# Patient Record
Sex: FEMALE
[De-identification: names, ages, dates, MRNs, and addresses within clinical notes are randomized per-mention and may not be internally consistent; named-entity substitution may affect disease eponyms.]

---

## 2017-06-07 ENCOUNTER — HOSPITAL ENCOUNTER (OUTPATIENT)
Dept: HOSPITAL 62 - RAD | Age: 82
End: 2017-06-07
Attending: PHYSICIAN ASSISTANT
Payer: MEDICARE

## 2017-06-07 DIAGNOSIS — M51.16: Primary | ICD-10-CM

## 2017-06-07 PROCEDURE — 72148 MRI LUMBAR SPINE W/O DYE: CPT

## 2017-06-08 NOTE — RADIOLOGY REPORT (SQ)
EXAM DESCRIPTION:  MRI LUMBAR SPINE WITHOUT



COMPLETED DATE/TIME:  6/7/2017 6:30 pm



REASON FOR STUDY:  OTHER INTERVERTEBRAL DISC DEGENERATION,LUMBAR REGION;RADICULOPATHY,LUMBAR R M51.36
  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



COMPARISON:  3/28/2008



TECHNIQUE:  Sagittal and Axial imaging includes T1, T2, STIR and gradient echo sequences. Coronal T2/
HASTE imaging.



LIMITATIONS:  Patient motion.



FINDINGS:  VISUALIZED UPPER ABDOMEN:  Limited evaluation. No acute or suspicious findings suggested.

SEGMENTATION: No transitional anatomy. The lowest well-developed disc space is labeled L5-S1.

ALIGNMENT: Convex left scoliosis.  Grade 1 anterolisthesis L4 relative to L3 and L5.

VERTEBRAE: 20% height loss L3 with preserved marrow signal.

BONE MARROW: Normal. No marrow replacement or reactive changes.

DISC SIGNAL: Desiccation multiple levels.

POSTERIOR ELEMENTS:  See individual levels below.

HARDWARE: None in the spine.

CORD AND CONUS: Normal in size and signal intensity. Conus at the appropriate level.

SOFT TISSUES: No aortic aneurysm seen. No bulky retroperitoneal adenopathy or mass. No paraspinal mas
s or fluid.

L1-L2: Ventral impression on the thecal sac due to disc bulge.  Facet arthropathy.  Mild neural michelle
inal narrowing bilaterally.

L2-L3: Ventral impression on the thecal sac due disc bulge.  Facet arthropathy.  Mild neural foramina
l narrowing, left greater than right.

L3-L4: Prior posterior decompression.  Canal is widely patent.  Bilateral exiting nerve root contact 
due to disc osteophyte complex, right greater than left.

L4-L5: Prior posterior decompression.  Bilateral exiting nerve root contact due to disc osteophyte co
mplex.

L5-S1: Disc bulge and facet arthropathy.  No significant spinal stenosis.  Mild neural foraminal narr
owing, left greater than right.

LOWER THORACIC: Incompletely imaged.  No stenosis seen.

SACRUM: Visualized upper sacrum intact.

OTHER: No other significant findings.



IMPRESSION:  Spondylosis, facet arthropathy and malalignment status post dorsal decompression L3- 4 a
nd L4- 5.  No significant change.



TECHNICAL DOCUMENTATION:  JOB ID:  [id number]

 [employer]- All Rights Reserved

## 2017-06-09 ENCOUNTER — HOSPITAL ENCOUNTER (OUTPATIENT)
Dept: HOSPITAL 62 - LAB | Age: 82
End: 2017-06-09
Attending: NURSE PRACTITIONER
Payer: MEDICARE

## 2017-06-09 DIAGNOSIS — R30.0: Primary | ICD-10-CM

## 2017-06-09 PROCEDURE — 87186 SC STD MICRODIL/AGAR DIL: CPT

## 2017-06-09 PROCEDURE — 87086 URINE CULTURE/COLONY COUNT: CPT

## 2017-06-09 PROCEDURE — 87088 URINE BACTERIA CULTURE: CPT

## 2017-11-27 ENCOUNTER — HOSPITAL ENCOUNTER (OUTPATIENT)
Dept: HOSPITAL 62 - WI | Age: 82
End: 2017-11-27
Attending: FAMILY MEDICINE
Payer: MEDICARE

## 2017-11-27 DIAGNOSIS — Z12.31: Primary | ICD-10-CM

## 2017-11-27 PROCEDURE — 77067 SCR MAMMO BI INCL CAD: CPT

## 2017-11-27 PROCEDURE — G0202 SCR MAMMO BI INCL CAD: HCPCS

## 2017-11-28 NOTE — WOMENS IMAGING REPORT
EXAM DESCRIPTION:  BILAT SCREENING MAMMO W/CAD



COMPLETED DATE/TIME:  11/27/2017 8:07 am



REASON FOR STUDY:  SCREENING MAMMO Z12.31  ENCNTR SCREEN MAMMOGRAM FOR MALIGNANT NEOPLASM OF PRASAD



COMPARISON:  Multiple since 2009



TECHNIQUE:  Standard craniocaudal and mediolateral oblique views of each breast recorded using digita
l acquisition.



LIMITATIONS:  None.



FINDINGS:  Findings present which are benign by mammographic criteria.  No suspicious masses, calcifi
cations or architectural distortion.

Pertinent benign findings: Scattered stable benign bilateral breast parenchymal and skin calcificatio
ns

Read with the assistance of CAD.

.[facility] - R2 Cenova Version 1.3

.[facility] - R2 Cenova Version 1.3

.[facility] - R2 Cenova Version 2.4

.[facility] - R2 Cenova Version 2.4

.[facility] - R2  Version 9.2

Benign mammographic findings may include one or more of the following:  Smooth masses, popcorn/rim/co
arse calcifications, asymmetries, post-procedure changes, and lesions with long-standing stability.



IMPRESSION:  BENIGN MAMMOGRAPHIC FINDINGS.  BIRADS 2



BREAST DENSITY:  b. There are scattered areas of fibroglandular density.



BIRAD:  2 BENIGN FINDING(S)



RECOMMENDATION:  ROUTINE SCREENING

Please consider bilateral screening tomosynthesis in November 2018



COMMENT:  The patient has been notified of the results by letter per SA requirements. Additional no
tification policies are in place for contacting patient with suspicious or incomplete findings.

Quality ID #[id number]: The American College of Radiology recommends an annual screening mammogram for women
 aged 40 years or over. This facility utilizes a reminder system to ensure that all patients receive 
reminder letters, and/or direct phone calls for appointments. This includes reminders for routine scr
eening mammograms, diagnostic mammograms, or other Breast Imaging Interventions when appropriate.  Th
is patient will be placed in the appropriate reminder system.

The American College of Radiology (ACR) has developed recommendations for screening MRI of the breast
s in certain patient populations, to be used in conjunction with mammography.  Breast MRI surveillanc
e may be appropriate for women with more than 20% lifetime risk of developing breast cancer  as deter
mined by genetic testing, significant family history of the disease, or history of mantle radiation f
or Hodgkins Disease.  ACR Practice Guidelines 2008.



TECHNICAL DOCUMENTATION:  FINDING NUMBER: (1)

ASSESSMENT: (1)

JOB ID:  [id number]

 2011 [employer]- All Rights Reserved

## 2018-01-03 ENCOUNTER — HOSPITAL ENCOUNTER (INPATIENT)
Dept: HOSPITAL 62 - ER | Age: 83
LOS: 3 days | Discharge: ANOTHER HOSPITAL | DRG: 208 | End: 2018-01-06
Attending: INTERNAL MEDICINE | Admitting: INTERNAL MEDICINE
Payer: MEDICARE

## 2018-01-03 DIAGNOSIS — J44.1: ICD-10-CM

## 2018-01-03 DIAGNOSIS — Z79.4: ICD-10-CM

## 2018-01-03 DIAGNOSIS — I25.10: ICD-10-CM

## 2018-01-03 DIAGNOSIS — J96.01: ICD-10-CM

## 2018-01-03 DIAGNOSIS — I11.0: ICD-10-CM

## 2018-01-03 DIAGNOSIS — E87.0: ICD-10-CM

## 2018-01-03 DIAGNOSIS — I50.9: ICD-10-CM

## 2018-01-03 DIAGNOSIS — D64.9: ICD-10-CM

## 2018-01-03 DIAGNOSIS — G89.29: ICD-10-CM

## 2018-01-03 DIAGNOSIS — E66.01: ICD-10-CM

## 2018-01-03 DIAGNOSIS — Z78.1: ICD-10-CM

## 2018-01-03 DIAGNOSIS — Z79.899: ICD-10-CM

## 2018-01-03 DIAGNOSIS — Z79.891: ICD-10-CM

## 2018-01-03 DIAGNOSIS — J96.02: ICD-10-CM

## 2018-01-03 DIAGNOSIS — J69.0: Primary | ICD-10-CM

## 2018-01-03 DIAGNOSIS — M19.90: ICD-10-CM

## 2018-01-03 DIAGNOSIS — F32.9: ICD-10-CM

## 2018-01-03 DIAGNOSIS — Z88.0: ICD-10-CM

## 2018-01-03 DIAGNOSIS — Z88.6: ICD-10-CM

## 2018-01-03 DIAGNOSIS — Z95.1: ICD-10-CM

## 2018-01-03 DIAGNOSIS — L40.9: ICD-10-CM

## 2018-01-03 DIAGNOSIS — E87.6: ICD-10-CM

## 2018-01-03 DIAGNOSIS — Z87.891: ICD-10-CM

## 2018-01-03 DIAGNOSIS — Z86.73: ICD-10-CM

## 2018-01-03 DIAGNOSIS — E11.9: ICD-10-CM

## 2018-01-03 DIAGNOSIS — F41.1: ICD-10-CM

## 2018-01-03 LAB
ADD MANUAL DIFF: NO
ALBUMIN SERPL-MCNC: 4.3 G/DL (ref 3.5–5)
ALP SERPL-CCNC: 43 U/L (ref 38–126)
ALT SERPL-CCNC: 28 U/L (ref 9–52)
ANION GAP SERPL CALC-SCNC: 15 MMOL/L (ref 5–19)
AST SERPL-CCNC: 32 U/L (ref 14–36)
BASE EXCESS BLDV CALC-SCNC: 5.2 MMOL/L
BASOPHILS # BLD AUTO: 0 10^3/UL (ref 0–0.2)
BASOPHILS NFR BLD AUTO: 0.6 % (ref 0–2)
BILIRUB DIRECT SERPL-MCNC: 0.4 MG/DL (ref 0–0.4)
BILIRUB SERPL-MCNC: 0.5 MG/DL (ref 0.2–1.3)
BUN SERPL-MCNC: 22 MG/DL (ref 7–20)
CALCIUM: 9.6 MG/DL (ref 8.4–10.2)
CHLORIDE SERPL-SCNC: 99 MMOL/L (ref 98–107)
CK SERPL-CCNC: 56 U/L (ref 30–135)
CO2 SERPL-SCNC: 29 MMOL/L (ref 22–30)
EOSINOPHIL # BLD AUTO: 0 10^3/UL (ref 0–0.6)
EOSINOPHIL NFR BLD AUTO: 0.4 % (ref 0–6)
ERYTHROCYTE [DISTWIDTH] IN BLOOD BY AUTOMATED COUNT: 15.2 % (ref 11.5–14)
GLUCOSE SERPL-MCNC: 143 MG/DL (ref 75–110)
HCO3 BLDV-SCNC: 31.5 MMOL/L (ref 20–32)
HCT VFR BLD CALC: 35.6 % (ref 36–47)
HGB BLD-MCNC: 11.8 G/DL (ref 12–15.5)
LYMPHOCYTES # BLD AUTO: 0.8 10^3/UL (ref 0.5–4.7)
LYMPHOCYTES NFR BLD AUTO: 9.8 % (ref 13–45)
MCH RBC QN AUTO: 29.1 PG (ref 27–33.4)
MCHC RBC AUTO-ENTMCNC: 33.1 G/DL (ref 32–36)
MCV RBC AUTO: 88 FL (ref 80–97)
MONOCYTES # BLD AUTO: 0.5 10^3/UL (ref 0.1–1.4)
MONOCYTES NFR BLD AUTO: 5.9 % (ref 3–13)
NEUTROPHILS # BLD AUTO: 7 10^3/UL (ref 1.7–8.2)
NEUTS SEG NFR BLD AUTO: 83.3 % (ref 42–78)
PCO2 BLDV: 53.4 MMHG (ref 35–63)
PH BLDV: 7.39 [PH] (ref 7.3–7.42)
PLATELET # BLD: 219 10^3/UL (ref 150–450)
POTASSIUM SERPL-SCNC: 3.4 MMOL/L (ref 3.6–5)
PROT SERPL-MCNC: 6.8 G/DL (ref 6.3–8.2)
RBC # BLD AUTO: 4.05 10^6/UL (ref 3.72–5.28)
SODIUM SERPL-SCNC: 143.3 MMOL/L (ref 137–145)
TOTAL CELLS COUNTED % (AUTO): 100 %
WBC # BLD AUTO: 8.4 10^3/UL (ref 4–10.5)

## 2018-01-03 PROCEDURE — 94640 AIRWAY INHALATION TREATMENT: CPT

## 2018-01-03 PROCEDURE — 81001 URINALYSIS AUTO W/SCOPE: CPT

## 2018-01-03 PROCEDURE — 36415 COLL VENOUS BLD VENIPUNCTURE: CPT

## 2018-01-03 PROCEDURE — 80053 COMPREHEN METABOLIC PANEL: CPT

## 2018-01-03 PROCEDURE — 99285 EMERGENCY DEPT VISIT HI MDM: CPT

## 2018-01-03 PROCEDURE — 87804 INFLUENZA ASSAY W/OPTIC: CPT

## 2018-01-03 PROCEDURE — 87088 URINE BACTERIA CULTURE: CPT

## 2018-01-03 PROCEDURE — 74018 RADEX ABDOMEN 1 VIEW: CPT

## 2018-01-03 PROCEDURE — 94660 CPAP INITIATION&MGMT: CPT

## 2018-01-03 PROCEDURE — 87040 BLOOD CULTURE FOR BACTERIA: CPT

## 2018-01-03 PROCEDURE — 87186 SC STD MICRODIL/AGAR DIL: CPT

## 2018-01-03 PROCEDURE — 51701 INSERT BLADDER CATHETER: CPT

## 2018-01-03 PROCEDURE — 96375 TX/PRO/DX INJ NEW DRUG ADDON: CPT

## 2018-01-03 PROCEDURE — 83880 ASSAY OF NATRIURETIC PEPTIDE: CPT

## 2018-01-03 PROCEDURE — 96365 THER/PROPH/DIAG IV INF INIT: CPT

## 2018-01-03 PROCEDURE — 85025 COMPLETE CBC W/AUTO DIFF WBC: CPT

## 2018-01-03 PROCEDURE — 31500 INSERT EMERGENCY AIRWAY: CPT

## 2018-01-03 PROCEDURE — 87070 CULTURE OTHR SPECIMN AEROBIC: CPT

## 2018-01-03 PROCEDURE — 94002 VENT MGMT INPAT INIT DAY: CPT

## 2018-01-03 PROCEDURE — 82550 ASSAY OF CK (CPK): CPT

## 2018-01-03 PROCEDURE — 87205 SMEAR GRAM STAIN: CPT

## 2018-01-03 PROCEDURE — 82803 BLOOD GASES ANY COMBINATION: CPT

## 2018-01-03 PROCEDURE — 83735 ASSAY OF MAGNESIUM: CPT

## 2018-01-03 PROCEDURE — 93010 ELECTROCARDIOGRAM REPORT: CPT

## 2018-01-03 PROCEDURE — 71275 CT ANGIOGRAPHY CHEST: CPT

## 2018-01-03 PROCEDURE — 83605 ASSAY OF LACTIC ACID: CPT

## 2018-01-03 PROCEDURE — 71045 X-RAY EXAM CHEST 1 VIEW: CPT

## 2018-01-03 PROCEDURE — 82962 GLUCOSE BLOOD TEST: CPT

## 2018-01-03 PROCEDURE — 80048 BASIC METABOLIC PNL TOTAL CA: CPT

## 2018-01-03 PROCEDURE — 87077 CULTURE AEROBIC IDENTIFY: CPT

## 2018-01-03 PROCEDURE — 93005 ELECTROCARDIOGRAM TRACING: CPT

## 2018-01-03 PROCEDURE — 87086 URINE CULTURE/COLONY COUNT: CPT

## 2018-01-03 PROCEDURE — 84484 ASSAY OF TROPONIN QUANT: CPT

## 2018-01-04 LAB
A TYPE INFLUENZA AG: NEGATIVE
APPEARANCE UR: CLEAR
APTT PPP: YELLOW S
ARTERIAL BLOOD FIO2: (no result)
ARTERIAL BLOOD H2CO3: 1.82 MMOL/L (ref 1.05–1.35)
ARTERIAL BLOOD HCO3: 28.4 MMOL/L (ref 20–26)
ARTERIAL BLOOD PCO2: 60.4 MMHG (ref 35–45)
ARTERIAL BLOOD PH: 7.29 (ref 7.35–7.45)
ARTERIAL BLOOD PO2: 75.6 MMHG (ref 80–100)
ARTERIAL BLOOD TOTAL CO2: 30.2 MMOL/L (ref 21–25)
B INFLUENZA AG: NEGATIVE
BASE EXCESS BLDA CALC-SCNC: 0.9 MMOL/L
BASE EXCESS BLDV CALC-SCNC: 1.3 MMOL/L
BILIRUB UR QL STRIP: NEGATIVE
GLUCOSE UR STRIP-MCNC: NEGATIVE MG/DL
HCO3 BLDV-SCNC: 30.5 MMOL/L (ref 20–32)
KETONES UR STRIP-MCNC: NEGATIVE MG/DL
NITRITE UR QL STRIP: POSITIVE
PCO2 BLDV: 72.2 MMHG (ref 35–63)
PH BLDV: 7.24 [PH] (ref 7.3–7.42)
PH UR STRIP: 5 [PH] (ref 5–9)
PROT UR STRIP-MCNC: NEGATIVE MG/DL
SAO2 % BLDA: 93.3 % (ref 94–98)
SP GR UR STRIP: 1.01
TROPONIN I SERPL-MCNC: 0.07 NG/ML
UROBILINOGEN UR-MCNC: NEGATIVE MG/DL (ref ?–2)

## 2018-01-04 PROCEDURE — 5A09457 ASSISTANCE WITH RESPIRATORY VENTILATION, 24-96 CONSECUTIVE HOURS, CONTINUOUS POSITIVE AIRWAY PRESSURE: ICD-10-PCS | Performed by: INTERNAL MEDICINE

## 2018-01-04 RX ADMIN — HEPARIN SODIUM SCH UNIT: 5000 INJECTION, SOLUTION INTRAVENOUS; SUBCUTANEOUS at 09:27

## 2018-01-04 RX ADMIN — IPRATROPIUM BROMIDE AND ALBUTEROL SULFATE SCH ML: 2.5; .5 SOLUTION RESPIRATORY (INHALATION) at 20:44

## 2018-01-04 RX ADMIN — FUROSEMIDE SCH MG: 40 TABLET ORAL at 09:14

## 2018-01-04 RX ADMIN — GUAIFENESIN SCH MG: 600 TABLET, EXTENDED RELEASE ORAL at 21:25

## 2018-01-04 RX ADMIN — METOPROLOL TARTRATE SCH MG: 50 TABLET, FILM COATED ORAL at 17:31

## 2018-01-04 RX ADMIN — INSULIN LISPRO PRN UNIT: 100 INJECTION, SOLUTION INTRAVENOUS; SUBCUTANEOUS at 08:57

## 2018-01-04 RX ADMIN — GUAIFENESIN SCH MG: 600 TABLET, EXTENDED RELEASE ORAL at 09:15

## 2018-01-04 RX ADMIN — HEPARIN SODIUM SCH UNIT: 5000 INJECTION, SOLUTION INTRAVENOUS; SUBCUTANEOUS at 14:08

## 2018-01-04 RX ADMIN — MAGNESIUM OXIDE TAB 400 MG (241.3 MG ELEMENTAL MG) SCH MG: 400 (241.3 MG) TAB at 09:11

## 2018-01-04 RX ADMIN — FUROSEMIDE SCH MG: 40 TABLET ORAL at 17:30

## 2018-01-04 RX ADMIN — CLOPIDOGREL BISULFATE SCH MG: 75 TABLET, FILM COATED ORAL at 09:12

## 2018-01-04 RX ADMIN — IPRATROPIUM BROMIDE AND ALBUTEROL SULFATE SCH ML: 2.5; .5 SOLUTION RESPIRATORY (INHALATION) at 14:26

## 2018-01-04 RX ADMIN — LANSOPRAZOLE SCH MG: 30 TABLET, ORALLY DISINTEGRATING, DELAYED RELEASE ORAL at 06:28

## 2018-01-04 RX ADMIN — FLUTICASONE PROPIONATE SCH SPRAY: 50 SPRAY, METERED NASAL at 21:59

## 2018-01-04 RX ADMIN — IPRATROPIUM BROMIDE AND ALBUTEROL SULFATE SCH ML: 2.5; .5 SOLUTION RESPIRATORY (INHALATION) at 08:30

## 2018-01-04 RX ADMIN — Medication SCH ML: at 09:28

## 2018-01-04 RX ADMIN — FLUTICASONE PROPIONATE SCH SPRAY: 50 SPRAY, METERED NASAL at 09:28

## 2018-01-04 RX ADMIN — HEPARIN SODIUM SCH UNIT: 5000 INJECTION, SOLUTION INTRAVENOUS; SUBCUTANEOUS at 21:25

## 2018-01-04 RX ADMIN — METOPROLOL TARTRATE SCH MG: 50 TABLET, FILM COATED ORAL at 09:13

## 2018-01-04 RX ADMIN — INSULIN LISPRO PRN UNIT: 100 INJECTION, SOLUTION INTRAVENOUS; SUBCUTANEOUS at 17:30

## 2018-01-04 RX ADMIN — MAGNESIUM OXIDE TAB 400 MG (241.3 MG ELEMENTAL MG) SCH MG: 400 (241.3 MG) TAB at 17:31

## 2018-01-04 RX ADMIN — Medication SCH ML: at 14:08

## 2018-01-04 RX ADMIN — Medication SCH ML: at 21:30

## 2018-01-04 NOTE — RADIOLOGY REPORT (SQ)
EXAM DESCRIPTION: 



CHEST SINGLE VIEW



CLINICAL HISTORY: 



82 years, Female, SOB



COMPARISON:

10.22.16



NUMBER OF VIEWS:

1



LIMITATIONS:

None.



FINDINGS:



Prominent interstitium, normal cardiac silhouette, sternotomy,

aortic valve replacement, and minimal osteoarthritis.



IMPRESSION:



No acute cardiopulmonary findings.

 



 2011 [employer]- All Rights Reserved

## 2018-01-04 NOTE — ER DOCUMENT REPORT
ED Respiratory Problem





- General


Chief Complaint: Breathing Difficulty


Stated Complaint: RESPIRATORY ISSUES


Time Seen by Provider: 01/03/18 22:53


Notes: 


The patient is an 82-year-old female, past medical history CHF, CAD, COPD, 

presents from home by EMS with increasing shortness of breath over the past 2 

days with a productive cough.  She was given 125 mg Solu-Medrol and 1 DuoNeb 

treatment EMS prior to arrival.  She was also found to be febrile and hypoxic 

to 81% on room air and was placed on 2 L nasal cannula with improvement of her 

oxygenation.  Patient denies chest pain, increased leg swelling, rash, nausea, 

vomiting, abdominal pain, back pain or headache.


TRAVEL OUTSIDE OF THE [place] IN LAST 30 DAYS: No





- Related Data


Allergies/Adverse Reactions: 


 





aspirin [Aspirin] Allergy (Severe, Verified 07/22/16 21:53)


 Generalized rash


oxytetracycline [From Terramycin] Allergy (Severe, Verified 08/28/12 17:54)


 


Penicillins Allergy (Severe, Verified 08/28/12 17:53)


 











Past Medical History





- General


Information source: Patient





- Social History


Smoking Status: Former Smoker


Chew tobacco use (# tins/day): No


Frequency of alcohol use: None


Drug Abuse: None


Family History: Reviewed & Not Pertinent


Patient has suicidal ideation: No


Patient has homicidal ideation: No





- Past Medical History


Cardiac Medical History: Reports: Hx Congestive Heart Failure, Hx Coronary 

Artery Disease, Hx Hypertension


   Denies: Hx Heart Attack


Pulmonary Medical History: Reports: Hx Asthma, Hx Bronchitis, Hx Pneumonia


   Denies: Hx COPD


Neurological Medical History: Reports: Hx Cerebrovascular Accident.  Denies: Hx 

Seizures


Endocrine Medical History: Reports: Hx Diabetes Mellitus Type 2


Renal/ Medical History: Denies: Hx Peritoneal Dialysis


Musculoskeltal Medical History: Reports Hx Arthritis


Psychiatric Medical History: Reports: Hx Depression


Past Surgical History: Reports: Hx Cardiac Surgery - CABG, Hx Coronary Artery 

Bypass Graft.  Denies: Hx Pacemaker





- Immunizations


Hx Diphtheria, Pertussis, Tetanus Vaccination: Yes


Hx Pneumococcal Vaccination: 10/01/05





Review of Systems





- Review of Systems


Notes: 


REVIEW OF SYSTEMS:


CONSTITUTIONAL: +fevers, -chills


EENT: -eye pain, -difficulty swallowing, -nasal congestion


CARDIOVASCULAR:-chest pain, -syncope.


RESPIRATORY: +cough, +SOB


GASTROINTESTINAL: -abdominal pain, -nausea, -vomiting, -diarrhea


GENITOURINARY: -dysuria, -hematuria


MUSCULOSKELETAL: -back pain, -neck pain


SKIN: -rash or skin lesions.


HEMATOLOGIC: -easy bruising or bleeding.


LYMPHATIC: -swollen, enlarged glands.


NEUROLOGICAL: -altered mental status or loss of consciousness, -headache, -

neurologic symptoms


PSYCHIATRIC: -anxiety, -depression.


ALL OTHER SYSTEMS REVIEWED AND NEGATIVE.





Physical Exam





- Vital signs


Vitals: 


 











Pulse Ox


 


 91 L


 


 01/03/18 22:52














- Notes


Notes: 


PHYSICAL EXAMINATION:





GENERAL: Mildly tachypneic.





HEAD: Atraumatic, normocephalic.





EYES: Pupils equal round and reactive to light, extraocular movements intact, 

sclera anicteric, conjunctiva are normal.





ENT: nares patent, oropharynx clear without exudates.  Moist mucous membranes.





NECK: Normal range of motion, supple without lymphadenopathy





LUNGS: Diffuse crackles. Tachypneic. 





HEART: Tachycardia, regular rhythm.





ABDOMEN: Soft, nontender, normoactive bowel sounds.  No guarding, no rebound.  

No masses appreciated.





EXTREMITIES: Normal range of motion, 1+pitting edema.  No cyanosis.





NEUROLOGICAL: Cranial nerves grossly intact.  Normal speech. Normal sensory and 

motor exams.





PSYCH: Normal mood, normal affect.





SKIN: Warm, Dry, normal turgor, no rashes or lesions noted.





Course





- Re-evaluation


Re-evalutation: 


Patient presents tachypneic, hypoxic to 81% on room air, febrile and 

tachycardic.  She has a productive cough and CAP antibiotics were started for 

suspected pneumonia prior to CTA results.  Patient has absolutely no chest pain 

and her EKG shows sinus tachycardia without evidence of a STEMI.  First 

troponin is 0.07 and second troponin is 0.09, which is below the AMI cutoff.  

Suspect this is heart strain and not true ischemia. Could not give ASA due to 

anaphylaxis allergy. CTA ordered due to suspicion for PE and pneumonia not 

picked up on chest x-ray however the CTA showed evidence of pulmonary artery 

hypertension, but no infiltrate or PE. After Duonebs and steroids, her wheezing 

has improved, but she is still feeling SOB. Pt requires inpatient admission due 

to hypoxia and further evaluation and treatment of her suspected viral 

bronchitis. Pt's PMD is Dr. Pancho Menendez.





- Vital Signs


Vital signs: 


 











Temp Pulse Resp BP Pulse Ox


 


 98.4 F   113 H  25 H  109/47 L  93 


 


 01/04/18 02:27  01/03/18 23:04  01/04/18 02:01  01/04/18 02:01  01/04/18 02:01














- Laboratory


Result Diagrams: 


 01/03/18 23:20





 01/03/18 23:20


Laboratory results interpreted by me: 


 











  01/03/18 01/03/18 01/03/18





  23:20 23:20 23:20


 


Hgb  11.8 L  


 


Hct  35.6 L  


 


RDW  15.2 H  


 


Seg Neutrophils %  83.3 H  


 


Lymphocytes %  9.8 L  


 


Potassium   3.4 L 


 


BUN   22 H 


 


Est GFR ( Amer)   52 L 


 


Est GFR (Non-Af Amer)   43 L 


 


Glucose   143 H 


 


NT-Pro-B Natriuret Pep    5900 H


 


Urine Nitrite   














  01/04/18





  00:03


 


Hgb 


 


Hct 


 


RDW 


 


Seg Neutrophils % 


 


Lymphocytes % 


 


Potassium 


 


BUN 


 


Est GFR ( Amer) 


 


Est GFR (Non-Af Amer) 


 


Glucose 


 


NT-Pro-B Natriuret Pep 


 


Urine Nitrite  POSITIVE H














- Diagnostic Test


Radiology reviewed: Image reviewed, Reports reviewed


Radiology results interpreted by me: 


CXR: NAD


CTA Chest: No PE. Pulmonary artery hypertension pattern.





- EKG Interpretation by Me


EKG shows normal: Sinus rhythm, Axis, Intervals, QRS Complexes, ST-T Waves


Rate: Tachycardia


When compared to previous EKG there are: No significant change


Additional EKG results interpreted by me: 


No STEMI





Discharge





- Discharge


Clinical Impression: 


 Hypoxia, Shortness of breath, Hypokalemia





Sepsis


Qualifiers:


 Sepsis type: sepsis due to unspecified organism Qualified Code(s): A41.9 - 

Sepsis, unspecified organism





Condition: Stable


Disposition: ADMITTED AS INPATIENT


Admitting Provider: Hospitalist [facility]


Unit Admitted: Telemetry


Referrals: 


PANCHO MENENDEZ MD [Primary Care Provider] - Follow up as needed

## 2018-01-04 NOTE — PDOC H&P
History of Present Illness


Admission Date/PCP: 


  01/04/18 02:46





  BEATRIZ MENENDEZ MD





Patient complains of: Respiratory distress


History of Present Illness: 


PARISA LAGUERRE is a 82 year old female with a past medical history of 

congestive heart failure, coronary artery disease, chronic bronchitis, COPD, 

diabetes, opiate dependent chronic pain, benzodiazepine dependent anxiety.  

Presents via EMS with 2 days of productive cough in the emergency room she is 

hypoxic with oxygen saturations of 80% on room air.  She is placed on BiPAP and 

receives albuterol and Solu-Medrol then referred to the hospitalist for 

admission.  When evaluated by MD she is found to be hypotensive, obtunded with 

pinpoint pupils.  Glucose is 250 she receives 2 mg of Narcan and awakens to 

state she may have taken too many Nucynta.


She is unwilling to tell me why.  An ABG returns with hypercapnic respiratory 

failure.





Past Medical History


Cardiac Medical History: Reports: Congestive Heart Failure, Coronary Artery 

Disease, Hypertension


   Denies: Myocardial Infarction


Pulmonary Medical History: Reports: Asthma, Bronchitis, Pneumonia


   Denies: Chronic Obstructive Pulmonary Disease (COPD)


Neurological Medical History: 


   Denies: Seizures


Endocrine Medical History: Reports: Diabetes Mellitus Type 2


Musculoskeltal Medical History: Reports: Arthritis


Psychiatric Medical History: Reports: Depression, General Anxiety Disorder, 

Other - Opiate dependent chronic pain


Hematology: Reports: Anemia





Past Surgical History


Past Surgical History: Reports: Coronary Artery Bypass Graft


   Denies: Pacemaker





Social History


Information Source: Emergency Med Personnel, [facility] Records


Smoking Status: Former Smoker


Frequency of Alcohol Use: None


Hx Recreational Drug Use: No


Drugs: None


Hx Prescription Drug Abuse: No





- Advance Directive


Resuscitation Status: Full Code





Family History


Parental Family History Reviewed: Yes


Children Family History Reviewed: Yes


Sibling(s) Family History Reviewed.: Yes





Medication/Allergy


Home Medications: 








Alprazolam [Xanax 0.5 mg Tablet] 0.5 mg PO BID PRN 10/22/16 


Clopidogrel Bisulfate [Clopidogrel] 75 mg PO DAILY 10/22/16 


Isosorbide Mononitrate [Isosorbide Mononitrate ER] 120 mg PO DAILY 10/22/16 


Magnesium Oxide 400 mg PO BID 10/22/16 


Metformin HCl 1,000 mg PO BID 10/22/16 


Metoprolol Tartrate [Lopressor 50 mg Tablet] 50 mg PO BID 10/22/16 


Potassium Chloride 10 meq PO DAILY 10/22/16 


Pregabalin [Lyrica 75 mg Capsule] 75 mg PO TID 10/22/16 


Ranolazine [Ranexa 500 mg Tab.sr] 500 mg PO TID 10/22/16 


Rosuvastatin Calcium [Crestor 10 mg Tablet] 10 mg PO 1800 10/22/16 


Sitagliptin Phosphate [Januvia] 100 mg PO DAILY 10/22/16 


Dexlansoprazole [Dexilant 60 mg Capsule] 60 mg PO DAILY 10/23/16 


Docusate Sodium [Stool Softener] 50 mg PO TID 10/23/16 


Insulin Glargine,Hum.rec.anlog [Lantus] 24 units SUBCUT DAILY 10/23/16 


Albuterol Sulfate [Ventolin HFA MDI 18 GM] 1 - 2 puff IH Q4H PRN #1 mdi 10/25/

16 


Furosemide [Lasix 80 mg Tablet] 80 mg PO QAM 01/04/18 


Isosorbide Mononitrate [Isosorbide Mononitrate ER] 60 mg PO DAILY 01/04/18 


Sitagliptin Phosphate [Januvia 50 mg Tablet] 100 mg PO DAILY 01/04/18 


Tapentadol HCl [Nucynta] 50 mg PO TID 01/04/18 








Allergies/Adverse Reactions: 


 





aspirin [Aspirin] Allergy (Severe, Verified 07/22/16 21:53)


 Generalized rash


oxytetracycline [From Terramycin] Allergy (Severe, Verified 08/28/12 17:54)


 


Penicillins Allergy (Severe, Verified 08/28/12 17:53)


 











Review of Systems


ROS unobtainable: Due to mental status





Physical Exam


Vital Signs: 


 











Temp Pulse Resp BP Pulse Ox


 


 98.4 F   113 H  20   109/47 L  96 


 


 01/04/18 02:27  01/03/18 23:04  01/04/18 04:15  01/04/18 02:01  01/04/18 04:15











General appearance: PRESENT: cooperative, disheveled, mild distress, obese


Head exam: PRESENT: atraumatic, normocephalic


Eye exam: PRESENT: conjunctiva pink, EOMI, PERRLA.  ABSENT: scleral icterus


Ear exam: PRESENT: normal external ear exam


Mouth exam: PRESENT: moist, tongue midline


Neck exam: ABSENT: carotid bruit, JVD, lymphadenopathy, thyromegaly


Respiratory exam: PRESENT: accessory muscle use, prolonged expiratory phas, 

symmetrical, tachypnea


Cardiovascular exam: PRESENT: RRR.  ABSENT: diastolic murmur, rubs, systolic 

murmur


Pulses: PRESENT: normal dorsalis pedis pul


Vascular exam: PRESENT: normal capillary refill


GI/Abdominal exam: PRESENT: normal bowel sounds, soft.  ABSENT: distended, 

guarding, mass, organolmegaly, rebound, tenderness


Rectal exam: PRESENT: deferred


Extremities exam: PRESENT: full ROM.  ABSENT: calf tenderness, clubbing, pedal 

edema


Neurological exam: PRESENT: altered, oriented to person, oriented to situation, 

CN II-XII grossly intact.  ABSENT: motor sensory deficit


Psychiatric exam: PRESENT: appropriate affect, flat affect.  ABSENT: homicidal 

ideation, suicidal ideation


Skin exam: PRESENT: dry, intact, warm.  ABSENT: cyanosis, rash





Results


Laboratory Results: 


 











  01/04/18





  04:16


 


Carbonic Acid  1.82 H


 


HCO3/H2CO3 Ratio  15:1


 


ABG pH  7.29 L


 


ABG pCO2  60.4 H


 


ABG pO2  75.6 L


 


ABG HCO3  28.4 H


 


ABG O2 Saturation  93.3 L


 


ABG Base Excess  0.9


 


FiO2  1.5L











Impressions: 


 





Chest X-Ray  01/03/18 22:54


IMPRESSION:


 


No acute cardiopulmonary findings.


 


 


 2011 [employer]- All Rights Reserved


 








Chest/Abdomen CTA  01/04/18 00:19


IMPRESSION:


Pulmonary arterial hypertension pattern. No evidence of pulmonary


embolus.


 














Assessment & Plan





- Diagnosis


(1) Opiate overdose


Is this a current diagnosis for this admission?: Yes   


Plan: 


Unclear intention, last admission patient poorly responsive on the ground.  

Supportive measures as needed Narcan mental health consult








(2) Depression


Is this a current diagnosis for this admission?: Yes   


Plan: 


Depression   I Will evaluate medication reconciliation, TSH and obtain urine 

drug screen, consider SSRI and or mental health consultation.








(3) Acute respiratory failure with hypoxia and hypercapnia


Is this a current diagnosis for this admission?: Yes   


Plan: 


Secondary to opiate overdose possible aspiration with pneumonitis complicated 

by COPD.  Albuterol and Atrovent with supportive measures consider empiric 

antibiotics.








(4) Morbid obesity


Is this a current diagnosis for this admission?: Yes   


Plan: 


Morbid obesity will evaluate for metabolic cause with evaluation of thyroid 

function and dietitian consultation








(5) Pneumonia


Qualifiers: 


   Pneumonia type: due to unspecified organism   Laterality: left   Lung 

location: lower lobe of lung   Qualified Code(s): J18.9 - Pneumonia, 

unspecified organism   


Is this a current diagnosis for this admission?: Yes   


Plan: 


Aspiration pneumonia versus pneumonitis empiric antibiotics.








- Time


Time Spent: 50 to 70 Minutes





- Inpatient Certification


Medical Necessity: Need Close Monitoring Due to Risk of Patient Decompensation

## 2018-01-04 NOTE — PDOC PROGRESS REPORT
Subjective


Progress Note for:: 01/04/18


Subjective:: 





Patient is alert and oriented at the time of my exam with no complaints.


Reason For Visit: 


COPD EXACERBATION DIABETES, SINUSTITIS, BRONCHITIS








Physical Exam


Vital Signs: 


 











Temp Pulse Resp BP Pulse Ox


 


 97.9 F   113 H  24 H  115/60   99 


 


 01/04/18 06:42  01/03/18 23:04  01/04/18 10:01  01/04/18 10:01  01/04/18 10:01








 Intake & Output











 01/03/18 01/04/18 01/05/18





 06:59 06:59 06:59


 


Intake Total   240


 


Balance   240


 


Weight   84.2 kg











General appearance: PRESENT: no acute distress


Eye exam: PRESENT: conjunctiva pink.  ABSENT: scleral icterus


Mouth exam: PRESENT: moist, tongue midline


Neck exam: ABSENT: JVD


Respiratory exam: PRESENT: clear to auscultation erin.  ABSENT: rales, rhonchi, 

wheezes


Cardiovascular exam: PRESENT: RRR.  ABSENT: diastolic murmur, rubs, systolic 

murmur


GI/Abdominal exam: PRESENT: normal bowel sounds, soft.  ABSENT: distended, 

guarding, mass, organolmegaly, rebound, tenderness


Extremities exam: ABSENT: calf tenderness, clubbing, pedal edema


Neurological exam: PRESENT: alert, awake, oriented to person, oriented to place

, oriented to time, oriented to situation, CN II-XII grossly intact.  ABSENT: 

motor sensory deficit


Psychiatric exam: PRESENT: appropriate affect


Skin exam: PRESENT: dry, intact, warm.  ABSENT: cyanosis, rash





Results


Laboratory Results: 


 











  01/04/18





  04:16


 


Carbonic Acid  1.82 H


 


HCO3/H2CO3 Ratio  15:1


 


ABG pH  7.29 L


 


ABG pCO2  60.4 H


 


ABG pO2  75.6 L


 


ABG HCO3  28.4 H


 


ABG O2 Saturation  93.3 L


 


ABG Base Excess  0.9


 


FiO2  1.5L











Impressions: 


 





Chest X-Ray  01/03/18 22:54


IMPRESSION:


 


No acute cardiopulmonary findings.


 


 


 2011 [employer]- All Rights Reserved


 








Chest/Abdomen CTA  01/04/18 00:19


IMPRESSION:


Pulmonary arterial hypertension pattern. No evidence of pulmonary


embolus.


 














Assessment & Plan





- Diagnosis


(1) Acute respiratory failure with hypoxia and hypercapnia


Is this a current diagnosis for this admission?: Yes   


Plan: 


There was concern that this was related to opioid overdose.  Her pill count is 

correct and does not appear to have taken excessive amounts of narcotics.  Most 

likely is related to her aspiration pneumonia








(2) Pneumonia


Qualifiers: 


   Pneumonia type: due to unspecified organism   Laterality: left   Lung 

location: lower lobe of lung   Qualified Code(s): J18.9 - Pneumonia, 

unspecified organism   


Is this a current diagnosis for this admission?: Yes   


Plan: 


Patient was minimally responsive for short period of time and may have 

aspirated with pneumonia versus pneumonitis.  We will continue with the 

Levaquin.








(3) Opiate overdose


Is this a current diagnosis for this admission?: Yes   


Plan: 


The patient had symptoms suggestive of opioid overdose however her pill count 

is correct.  That may have been that she was just more sensitive to the 

medications.








(4) Depression


Is this a current diagnosis for this admission?: Yes   





(5) CHF (congestive heart failure)


Qualifiers: 


   Congestive heart failure type: unspecified congestive heart failure type   

Congestive heart failure chronicity: unspecified congestive heart failure 

chronicity   Qualified Code(s): I50.9 - Heart failure, unspecified   


Is this a current diagnosis for this admission?: Yes   


Plan: 


Patient appears to be euvolemic at this time.








(6) Coronary artery disease


Is this a current diagnosis for this admission?: Yes   


Plan: 


Denies any chest pain.








(7) Morbid obesity


Is this a current diagnosis for this admission?: Yes   





- Time


Time Spent with patient: 25-34 minutes





- Inpatient Certification


Medical Necessity: Need for IV Antibiotics

## 2018-01-04 NOTE — EKG REPORT
SEVERITY:- ABNORMAL ECG -

SINUS TACHYCARDIA

ABNRM R PROG, CONSIDER ASMI OR LEAD PLACEMENT

REPOL ABNRM SUGGESTS ISCHEMIA, DIFFUSE LEADS

:

Confirmed by: Abiel Rincon 04-Jan-2018 14:08:25

## 2018-01-04 NOTE — EKG REPORT
SEVERITY:- ABNORMAL ECG -

SINUS TACHYCARDIA

ABNRM R PROG, CONSIDER ASMI OR LEAD PLACEMENT

REPOL ABNRM SUGGESTS ISCHEMIA, DIFFUSE LEADS

:

Confirmed by: Abiel Rincon 04-Jan-2018 14:07:11

## 2018-01-05 LAB
ADD MANUAL DIFF: NO
ANION GAP SERPL CALC-SCNC: 10 MMOL/L (ref 5–19)
ANION GAP SERPL CALC-SCNC: 11 MMOL/L (ref 5–19)
ARTERIAL BLOOD FIO2: (no result)
ARTERIAL BLOOD H2CO3: 2.09 MMOL/L (ref 1.05–1.35)
ARTERIAL BLOOD HCO3: 32.4 MMOL/L (ref 20–26)
ARTERIAL BLOOD PCO2: 69.5 MMHG (ref 35–45)
ARTERIAL BLOOD PH: 7.29 (ref 7.35–7.45)
ARTERIAL BLOOD PO2: 110.2 MMHG (ref 80–100)
ARTERIAL BLOOD TOTAL CO2: 34.6 MMOL/L (ref 21–25)
BASE EXCESS BLDA CALC-SCNC: 3.4 MMOL/L
BASOPHILS # BLD AUTO: 0 10^3/UL (ref 0–0.2)
BASOPHILS NFR BLD AUTO: 0.4 % (ref 0–2)
BUN SERPL-MCNC: 22 MG/DL (ref 7–20)
BUN SERPL-MCNC: 25 MG/DL (ref 7–20)
CALCIUM: 9 MG/DL (ref 8.4–10.2)
CALCIUM: 9.2 MG/DL (ref 8.4–10.2)
CHLORIDE SERPL-SCNC: 102 MMOL/L (ref 98–107)
CHLORIDE SERPL-SCNC: 98 MMOL/L (ref 98–107)
CO2 SERPL-SCNC: 33 MMOL/L (ref 22–30)
CO2 SERPL-SCNC: 34 MMOL/L (ref 22–30)
EOSINOPHIL # BLD AUTO: 0 10^3/UL (ref 0–0.6)
EOSINOPHIL NFR BLD AUTO: 0 % (ref 0–6)
ERYTHROCYTE [DISTWIDTH] IN BLOOD BY AUTOMATED COUNT: 15.6 % (ref 11.5–14)
GLUCOSE SERPL-MCNC: 110 MG/DL (ref 75–110)
GLUCOSE SERPL-MCNC: 89 MG/DL (ref 75–110)
HCT VFR BLD CALC: 34.9 % (ref 36–47)
HGB BLD-MCNC: 11.5 G/DL (ref 12–15.5)
LYMPHOCYTES # BLD AUTO: 0.7 10^3/UL (ref 0.5–4.7)
LYMPHOCYTES NFR BLD AUTO: 6.2 % (ref 13–45)
MCH RBC QN AUTO: 29.1 PG (ref 27–33.4)
MCHC RBC AUTO-ENTMCNC: 33 G/DL (ref 32–36)
MCV RBC AUTO: 88 FL (ref 80–97)
MONOCYTES # BLD AUTO: 0.5 10^3/UL (ref 0.1–1.4)
MONOCYTES NFR BLD AUTO: 5 % (ref 3–13)
NEUTROPHILS # BLD AUTO: 9.6 10^3/UL (ref 1.7–8.2)
NEUTS SEG NFR BLD AUTO: 88.4 % (ref 42–78)
PLATELET # BLD: 198 10^3/UL (ref 150–450)
POTASSIUM SERPL-SCNC: 3.7 MMOL/L (ref 3.6–5)
POTASSIUM SERPL-SCNC: 3.8 MMOL/L (ref 3.6–5)
RBC # BLD AUTO: 3.97 10^6/UL (ref 3.72–5.28)
SAO2 % BLDA: 97.3 % (ref 94–98)
SODIUM SERPL-SCNC: 142.4 MMOL/L (ref 137–145)
SODIUM SERPL-SCNC: 145.8 MMOL/L (ref 137–145)
TOTAL CELLS COUNTED % (AUTO): 100 %
WBC # BLD AUTO: 10.9 10^3/UL (ref 4–10.5)

## 2018-01-05 RX ADMIN — FLUTICASONE PROPIONATE SCH SPRAY: 50 SPRAY, METERED NASAL at 13:14

## 2018-01-05 RX ADMIN — Medication SCH ML: at 21:58

## 2018-01-05 RX ADMIN — IPRATROPIUM BROMIDE AND ALBUTEROL SULFATE SCH ML: 2.5; .5 SOLUTION RESPIRATORY (INHALATION) at 08:37

## 2018-01-05 RX ADMIN — LANSOPRAZOLE SCH MG: 30 TABLET, ORALLY DISINTEGRATING, DELAYED RELEASE ORAL at 05:27

## 2018-01-05 RX ADMIN — INSULIN GLARGINE SCH UNIT: 100 INJECTION, SOLUTION SUBCUTANEOUS at 13:14

## 2018-01-05 RX ADMIN — GUAIFENESIN SCH MG: 600 TABLET, EXTENDED RELEASE ORAL at 21:58

## 2018-01-05 RX ADMIN — METOPROLOL TARTRATE PRN MG: 5 INJECTION, SOLUTION INTRAVENOUS at 18:53

## 2018-01-05 RX ADMIN — HEPARIN SODIUM SCH UNIT: 5000 INJECTION, SOLUTION INTRAVENOUS; SUBCUTANEOUS at 05:34

## 2018-01-05 RX ADMIN — MAGNESIUM OXIDE TAB 400 MG (241.3 MG ELEMENTAL MG) SCH MG: 400 (241.3 MG) TAB at 18:21

## 2018-01-05 RX ADMIN — MAGNESIUM OXIDE TAB 400 MG (241.3 MG ELEMENTAL MG) SCH MG: 400 (241.3 MG) TAB at 13:14

## 2018-01-05 RX ADMIN — Medication SCH ML: at 14:32

## 2018-01-05 RX ADMIN — HEPARIN SODIUM SCH UNIT: 5000 INJECTION, SOLUTION INTRAVENOUS; SUBCUTANEOUS at 21:53

## 2018-01-05 RX ADMIN — CLOPIDOGREL BISULFATE SCH MG: 75 TABLET, FILM COATED ORAL at 13:14

## 2018-01-05 RX ADMIN — IPRATROPIUM BROMIDE AND ALBUTEROL SULFATE SCH ML: 2.5; .5 SOLUTION RESPIRATORY (INHALATION) at 13:58

## 2018-01-05 RX ADMIN — METOPROLOL TARTRATE SCH MG: 50 TABLET, FILM COATED ORAL at 13:14

## 2018-01-05 RX ADMIN — IPRATROPIUM BROMIDE AND ALBUTEROL SULFATE SCH ML: 2.5; .5 SOLUTION RESPIRATORY (INHALATION) at 20:00

## 2018-01-05 RX ADMIN — METOPROLOL TARTRATE SCH MG: 50 TABLET, FILM COATED ORAL at 18:21

## 2018-01-05 RX ADMIN — HEPARIN SODIUM SCH UNIT: 5000 INJECTION, SOLUTION INTRAVENOUS; SUBCUTANEOUS at 14:32

## 2018-01-05 RX ADMIN — IPRATROPIUM BROMIDE AND ALBUTEROL SULFATE SCH ML: 2.5; .5 SOLUTION RESPIRATORY (INHALATION) at 01:29

## 2018-01-05 RX ADMIN — Medication SCH ML: at 05:42

## 2018-01-05 RX ADMIN — GUAIFENESIN SCH MG: 600 TABLET, EXTENDED RELEASE ORAL at 13:14

## 2018-01-05 RX ADMIN — FLUTICASONE PROPIONATE SCH SPRAY: 50 SPRAY, METERED NASAL at 21:58

## 2018-01-05 NOTE — PSYCHOLOGICAL NOTE
Psych Note





- Psych Note


Psych Note: 





Reason for consult: Suspected Overdose; Unknown intent,  Opioid dependent





Consent permissions: none given





PARISA LAGUERRE is a 82 year old female with a past medical history of 

congestive heart failure, coronary artery disease, chronic bronchitis, COPD, 

diabetes, opiate dependent chronic pain, benzodiazepine dependent anxiety.  

Presents via EMS with 2 days of productive cough in the emergency room.





Patient had an altered mental status and was demonstrating signs of opioid 

overdose which required Narcan.  After second round of Narcan, the patient was 

alert and orientated.  





The patient's chart reviewed:


attending Hospitalist noted


There was concern that this was related to opioid overdose.  Her pill count is 

correct and does not appear to have taken excessive amounts of narcotics.  





Diagnosis Deferred





At this time the patient is cleared from mental health.  Patient appears to not 

have overdosed on her medication and her presentation is medical in nature.  If 

new concerns are noted, please re-consult.  Dr. Deluca was consulted on 

thecare and management of this patient.  Thank you for this consultation.

## 2018-01-05 NOTE — RADIOLOGY REPORT (SQ)
EXAM DESCRIPTION: 



CHEST SINGLE VIEW



CLINICAL HISTORY: 



82 years, Female, SOB



COMPARISON:

1/3/2018



LIMITATIONS:

None.



FINDINGS:



Mild mixed interstitial and airspace opacity with lower lobe

predominance, prominent cardiac silhouette, aortic valve

replacement, and mild osteoarthritis.



IMPRESSION:



Worsened mild mixed interstitial and airspace opacity may

indicate pulmonary edema and/or multifocal pneumonia.

 



 2011 [employer]- All Rights Reserved

## 2018-01-05 NOTE — PDOC PROGRESS REPORT
Subjective


Progress Note for:: 01/05/18


Subjective:: 


The patient became agitated last night received some Ativan and morphine.  This 

morning she will arouse to painful stimuli but is confused.  She is on BiPAP.


Reason For Visit: 


COPD EXACERBATION DIABETES, SINUSTITIS, BRONCHITIS








Physical Exam


Vital Signs: 


 











Temp Pulse Resp BP Pulse Ox


 


 98.5 F   119 H  23 H  125/60   96 


 


 01/05/18 09:24  01/05/18 08:37  01/05/18 10:01  01/05/18 10:00  01/05/18 10:01








 Intake & Output











 01/04/18 01/05/18 01/06/18





 06:59 06:59 06:59


 


Intake Total  260 


 


Output Total  1300 


 


Balance  -1040 


 


Weight  80.1 kg 











General appearance: PRESENT: no acute distress


Eye exam: PRESENT: conjunctiva pink.  ABSENT: scleral icterus


Mouth exam: PRESENT: moist, tongue midline


Neck exam: ABSENT: JVD


Respiratory exam: PRESENT: rhonchi.  ABSENT: rales, wheezes


Cardiovascular exam: PRESENT: RRR.  ABSENT: diastolic murmur, rubs, systolic 

murmur


GI/Abdominal exam: PRESENT: normal bowel sounds, soft.  ABSENT: distended, 

guarding, mass, organolmegaly, rebound, tenderness


Extremities exam: ABSENT: calf tenderness, clubbing, pedal edema


Neurological exam: PRESENT: altered


Psychiatric exam: PRESENT: flat affect


Skin exam: PRESENT: dry, intact, warm.  ABSENT: cyanosis, rash





Results


Laboratory Results: 


 





 01/05/18 05:10 





 01/05/18 05:10 





 











  01/04/18 01/04/18 01/05/18





  15:20 23:55 05:10


 


WBC    10.9 H


 


RBC    3.97


 


Hgb    11.5 L


 


Hct    34.9 L


 


MCV    88


 


MCH    29.1


 


MCHC    33.0


 


RDW    15.6 H


 


Plt Count    198


 


Seg Neutrophils %    88.4 H


 


Lymphocytes %    6.2 L


 


Monocytes %    5.0


 


Eosinophils %    0.0


 


Basophils %    0.4


 


Absolute Neutrophils    9.6 H


 


Absolute Lymphocytes    0.7


 


Absolute Monocytes    0.5


 


Absolute Eosinophils    0.0


 


Absolute Basophils    0.0


 


Carbonic Acid   


 


HCO3/H2CO3 Ratio   


 


ABG pH   


 


ABG pCO2   


 


ABG pO2   


 


ABG HCO3   


 


ABG O2 Saturation   


 


ABG Base Excess   


 


VBG pH  7.24 L  


 


VBG pCO2  72.2 H*  


 


VBG HCO3  30.5  


 


VBG Base Excess  1.3  


 


FiO2   


 


Sodium   142.4 


 


Potassium   3.7 


 


Chloride   98 


 


Carbon Dioxide   33 H 


 


Anion Gap   11 


 


BUN   25 H 


 


Creatinine   1.29 H 


 


Est GFR ( Amer)   48 L 


 


Est GFR (Non-Af Amer)   40 L 


 


Glucose   110 


 


Calcium   9.0 














  01/05/18 01/05/18





  05:10 11:25


 


WBC  


 


RBC  


 


Hgb  


 


Hct  


 


MCV  


 


MCH  


 


MCHC  


 


RDW  


 


Plt Count  


 


Seg Neutrophils %  


 


Lymphocytes %  


 


Monocytes %  


 


Eosinophils %  


 


Basophils %  


 


Absolute Neutrophils  


 


Absolute Lymphocytes  


 


Absolute Monocytes  


 


Absolute Eosinophils  


 


Absolute Basophils  


 


Carbonic Acid   2.09 H


 


HCO3/H2CO3 Ratio   15:1


 


ABG pH   7.29 L


 


ABG pCO2   69.5 H*


 


ABG pO2   110.2 H


 


ABG HCO3   32.4 H


 


ABG O2 Saturation   97.3


 


ABG Base Excess   3.4


 


VBG pH  


 


VBG pCO2  


 


VBG HCO3  


 


VBG Base Excess  


 


FiO2   50%


 


Sodium  145.8 H 


 


Potassium  3.8 


 


Chloride  102 


 


Carbon Dioxide  34 H 


 


Anion Gap  10 


 


BUN  22 H 


 


Creatinine  1.24 


 


Est GFR ( Amer)  50 L 


 


Est GFR (Non-Af Amer)  42 L 


 


Glucose  89 


 


Calcium  9.2 











Impressions: 


 





Chest/Abdomen CTA  01/04/18 00:19


IMPRESSION:


Pulmonary arterial hypertension pattern. No evidence of pulmonary


embolus.


 








Chest X-Ray  01/05/18 00:00


IMPRESSION:


 


Worsened mild mixed interstitial and airspace opacity may


indicate pulmonary edema and/or multifocal pneumonia.


 


 


 2011 [employer]- All Rights Reserved


 














Assessment & Plan





- Diagnosis


(1) Acute respiratory failure with hypoxia and hypercapnia


Is this a current diagnosis for this admission?: Yes   


Plan: 


There was concern that this was related to opioid overdose.  Her pill count is 

correct and does not appear to have taken excessive amounts of narcotics.  Most 

likely is related to her aspiration pneumonia.  She may also have a component 

of CHF.  Will add on Lasix today.








(2) Pneumonia


Qualifiers: 


   Pneumonia type: due to unspecified organism   Laterality: left   Lung 

location: lower lobe of lung   Qualified Code(s): J18.9 - Pneumonia, 

unspecified organism   


Is this a current diagnosis for this admission?: Yes   


Plan: 


Patient was minimally responsive for short period of time and may have 

aspirated with pneumonia versus pneumonitis.  We will continue with the 

Levaquin.








(3) Opiate overdose


Is this a current diagnosis for this admission?: Yes   


Plan: 


The patient had symptoms suggestive of opioid overdose however her pill count 

is correct.  That may have been that she was just more sensitive to the 

medications.








(4) Depression


Is this a current diagnosis for this admission?: Yes   





(5) CHF (congestive heart failure)


Qualifiers: 


   Congestive heart failure type: unspecified congestive heart failure type   

Congestive heart failure chronicity: unspecified congestive heart failure 

chronicity   Qualified Code(s): I50.9 - Heart failure, unspecified   


Is this a current diagnosis for this admission?: Yes   


Plan: 


Patient may have some volume overload.  Will start IV Lasix today.








(6) Coronary artery disease


Is this a current diagnosis for this admission?: Yes   


Plan: 


Denies any chest pain.








(7) Morbid obesity


Is this a current diagnosis for this admission?: Yes   





- Time


Time Spent with patient: 25-34 minutes

## 2018-01-06 VITALS — SYSTOLIC BLOOD PRESSURE: 120 MMHG | DIASTOLIC BLOOD PRESSURE: 61 MMHG

## 2018-01-06 LAB
ADD MANUAL DIFF: NO
ANION GAP SERPL CALC-SCNC: 13 MMOL/L (ref 5–19)
ARTERIAL BLOOD FIO2: (no result)
ARTERIAL BLOOD FIO2: (no result)
ARTERIAL BLOOD H2CO3: 1.27 MMOL/L (ref 1.05–1.35)
ARTERIAL BLOOD H2CO3: 2.85 MMOL/L (ref 1.05–1.35)
ARTERIAL BLOOD HCO3: 26.7 MMOL/L (ref 20–26)
ARTERIAL BLOOD HCO3: 35 MMOL/L (ref 20–26)
ARTERIAL BLOOD PCO2: 42.1 MMHG (ref 35–45)
ARTERIAL BLOOD PCO2: 94.8 MMHG (ref 35–45)
ARTERIAL BLOOD PH: 7.19 (ref 7.35–7.45)
ARTERIAL BLOOD PH: 7.42 (ref 7.35–7.45)
ARTERIAL BLOOD PO2: 100 MMHG (ref 80–100)
ARTERIAL BLOOD PO2: 109.4 MMHG (ref 80–100)
ARTERIAL BLOOD TOTAL CO2: 28 MMOL/L (ref 21–25)
ARTERIAL BLOOD TOTAL CO2: 37.9 MMOL/L (ref 21–25)
BASE EXCESS BLDA CALC-SCNC: 2 MMOL/L
BASE EXCESS BLDA CALC-SCNC: 4.1 MMOL/L
BASOPHILS # BLD AUTO: 0 10^3/UL (ref 0–0.2)
BASOPHILS NFR BLD AUTO: 0.3 % (ref 0–2)
BUN SERPL-MCNC: 32 MG/DL (ref 7–20)
CALCIUM: 9.3 MG/DL (ref 8.4–10.2)
CHLORIDE SERPL-SCNC: 104 MMOL/L (ref 98–107)
CO2 SERPL-SCNC: 33 MMOL/L (ref 22–30)
EOSINOPHIL # BLD AUTO: 0 10^3/UL (ref 0–0.6)
EOSINOPHIL NFR BLD AUTO: 0 % (ref 0–6)
ERYTHROCYTE [DISTWIDTH] IN BLOOD BY AUTOMATED COUNT: 15.7 % (ref 11.5–14)
GLUCOSE SERPL-MCNC: 107 MG/DL (ref 75–110)
HCT VFR BLD CALC: 34.5 % (ref 36–47)
HGB BLD-MCNC: 11.2 G/DL (ref 12–15.5)
LYMPHOCYTES # BLD AUTO: 0.5 10^3/UL (ref 0.5–4.7)
LYMPHOCYTES NFR BLD AUTO: 6 % (ref 13–45)
MCH RBC QN AUTO: 29.2 PG (ref 27–33.4)
MCHC RBC AUTO-ENTMCNC: 32.4 G/DL (ref 32–36)
MCV RBC AUTO: 90 FL (ref 80–97)
MONOCYTES # BLD AUTO: 0.6 10^3/UL (ref 0.1–1.4)
MONOCYTES NFR BLD AUTO: 6.6 % (ref 3–13)
NEUTROPHILS # BLD AUTO: 7.8 10^3/UL (ref 1.7–8.2)
NEUTS SEG NFR BLD AUTO: 87.1 % (ref 42–78)
PLATELET # BLD: 210 10^3/UL (ref 150–450)
POTASSIUM SERPL-SCNC: 4.2 MMOL/L (ref 3.6–5)
RBC # BLD AUTO: 3.83 10^6/UL (ref 3.72–5.28)
SAO2 % BLDA: 95.5 % (ref 94–98)
SAO2 % BLDA: 98.1 % (ref 94–98)
SODIUM SERPL-SCNC: 149.7 MMOL/L (ref 137–145)
TOTAL CELLS COUNTED % (AUTO): 100 %
WBC # BLD AUTO: 8.9 10^3/UL (ref 4–10.5)

## 2018-01-06 PROCEDURE — 0BH17EZ INSERTION OF ENDOTRACHEAL AIRWAY INTO TRACHEA, VIA NATURAL OR ARTIFICIAL OPENING: ICD-10-PCS

## 2018-01-06 PROCEDURE — 5A1935Z RESPIRATORY VENTILATION, LESS THAN 24 CONSECUTIVE HOURS: ICD-10-PCS | Performed by: INTERNAL MEDICINE

## 2018-01-06 RX ADMIN — INSULIN GLARGINE SCH UNIT: 100 INJECTION, SOLUTION SUBCUTANEOUS at 17:35

## 2018-01-06 RX ADMIN — MAGNESIUM OXIDE TAB 400 MG (241.3 MG ELEMENTAL MG) SCH MG: 400 (241.3 MG) TAB at 17:35

## 2018-01-06 RX ADMIN — LANSOPRAZOLE SCH MG: 30 TABLET, ORALLY DISINTEGRATING, DELAYED RELEASE ORAL at 05:19

## 2018-01-06 RX ADMIN — IPRATROPIUM BROMIDE AND ALBUTEROL SULFATE SCH ML: 2.5; .5 SOLUTION RESPIRATORY (INHALATION) at 20:15

## 2018-01-06 RX ADMIN — HEPARIN SODIUM SCH UNIT: 5000 INJECTION, SOLUTION INTRAVENOUS; SUBCUTANEOUS at 17:35

## 2018-01-06 RX ADMIN — FLUTICASONE PROPIONATE SCH SPRAY: 50 SPRAY, METERED NASAL at 17:35

## 2018-01-06 RX ADMIN — MAGNESIUM OXIDE TAB 400 MG (241.3 MG ELEMENTAL MG) SCH MG: 400 (241.3 MG) TAB at 17:59

## 2018-01-06 RX ADMIN — METOPROLOL TARTRATE PRN MG: 5 INJECTION, SOLUTION INTRAVENOUS at 04:18

## 2018-01-06 RX ADMIN — HEPARIN SODIUM SCH UNIT: 5000 INJECTION, SOLUTION INTRAVENOUS; SUBCUTANEOUS at 05:16

## 2018-01-06 RX ADMIN — Medication SCH ML: at 05:19

## 2018-01-06 RX ADMIN — IPRATROPIUM BROMIDE AND ALBUTEROL SULFATE SCH ML: 2.5; .5 SOLUTION RESPIRATORY (INHALATION) at 08:59

## 2018-01-06 RX ADMIN — IPRATROPIUM BROMIDE AND ALBUTEROL SULFATE SCH ML: 2.5; .5 SOLUTION RESPIRATORY (INHALATION) at 01:54

## 2018-01-06 RX ADMIN — Medication SCH ML: at 17:35

## 2018-01-06 RX ADMIN — IPRATROPIUM BROMIDE AND ALBUTEROL SULFATE SCH ML: 2.5; .5 SOLUTION RESPIRATORY (INHALATION) at 13:58

## 2018-01-06 NOTE — EKG REPORT
SEVERITY:- BORDERLINE ECG -

SINUS TACHYCARDIA

LOW VOLTAGE IN FRONTAL LEADS

BORDERLINE R WAVE PROGRESSION, ANTERIOR LEADS

:

Confirmed by: Abiel Rincon 06-Jan-2018 20:09:16

## 2018-01-06 NOTE — PDOC PROGRESS REPORT
Subjective


Progress Note for:: 01/06/18


Subjective:: 





Patient was unresponsive this morning.  ABG showed her to be acidotic and 

hypercarbic.  Because of this she was moved to the intensive care unit and 

intubated.  I discussed this with her significant other who wanted everything 

done.


Reason For Visit: 


COPD EXACERBATION DIABETES, SINUSTITIS, BRONCHITIS








Physical Exam


Vital Signs: 


 











Temp Pulse Resp BP Pulse Ox


 


 100.5 F H  107 H  19   97/58 L  100 


 


 01/06/18 10:00  01/06/18 10:00  01/06/18 10:00  01/06/18 10:00  01/06/18 10:00








 Intake & Output











 01/05/18 01/06/18 01/07/18





 06:59 06:59 06:59


 


Intake Total 260 60 300


 


Output Total 1300 1050 60


 


Balance -1040 -990 240


 


Weight 80.1 kg 73 kg 











General appearance: PRESENT: severe distress


Eye exam: PRESENT: conjunctiva pink.  ABSENT: scleral icterus


Neck exam: ABSENT: JVD


Respiratory exam: PRESENT: wheezes - Bilateral.  ABSENT: rales, rhonchi


Cardiovascular exam: PRESENT: RRR, tachycardia.  ABSENT: diastolic murmur, rubs

, systolic murmur


GI/Abdominal exam: PRESENT: normal bowel sounds, soft.  ABSENT: distended, 

guarding, mass, organolmegaly, rebound, tenderness


Extremities exam: PRESENT: pedal edema.  ABSENT: calf tenderness, clubbing


Neurological exam: PRESENT: other - Unresponsive


Psychiatric exam: PRESENT: other - Unable to assess


Skin exam: PRESENT: dry, intact, warm.  ABSENT: cyanosis, rash





Results


Laboratory Results: 


 





 01/06/18 04:30 





 01/06/18 04:30 





 











  01/05/18 01/06/18 01/06/18





  11:25 04:30 04:30


 


WBC   8.9 


 


RBC   3.83 


 


Hgb   11.2 L 


 


Hct   34.5 L 


 


MCV   90 


 


MCH   29.2 


 


MCHC   32.4 


 


RDW   15.7 H 


 


Plt Count   210 


 


Seg Neutrophils %   87.1 H 


 


Lymphocytes %   6.0 L 


 


Monocytes %   6.6 


 


Eosinophils %   0.0 


 


Basophils %   0.3 


 


Absolute Neutrophils   7.8 


 


Absolute Lymphocytes   0.5 


 


Absolute Monocytes   0.6 


 


Absolute Eosinophils   0.0 


 


Absolute Basophils   0.0 


 


Carbonic Acid  2.09 H  


 


HCO3/H2CO3 Ratio  15:1  


 


ABG pH  7.29 L  


 


ABG pCO2  69.5 H*  


 


ABG pO2  110.2 H  


 


ABG HCO3  32.4 H  


 


ABG O2 Saturation  97.3  


 


ABG Base Excess  3.4  


 


FiO2  50%  


 


Sodium    149.7 H


 


Potassium    4.2


 


Chloride    104


 


Carbon Dioxide    33 H


 


Anion Gap    13


 


BUN    32 H


 


Creatinine    1.63 H


 


Est GFR ( Amer)    37 L


 


Est GFR (Non-Af Amer)    30 L


 


Glucose    107


 


Calcium    9.3


 


Magnesium   














  01/06/18 01/06/18





  04:30 08:45


 


WBC  


 


RBC  


 


Hgb  


 


Hct  


 


MCV  


 


MCH  


 


MCHC  


 


RDW  


 


Plt Count  


 


Seg Neutrophils %  


 


Lymphocytes %  


 


Monocytes %  


 


Eosinophils %  


 


Basophils %  


 


Absolute Neutrophils  


 


Absolute Lymphocytes  


 


Absolute Monocytes  


 


Absolute Eosinophils  


 


Absolute Basophils  


 


Carbonic Acid   2.85 H


 


HCO3/H2CO3 Ratio   12:1


 


ABG pH   7.19 L*


 


ABG pCO2   94.8 H*


 


ABG pO2   100.0


 


ABG HCO3   35.0 H


 


ABG O2 Saturation   95.5


 


ABG Base Excess   4.1


 


FiO2   50%


 


Sodium  


 


Potassium  


 


Chloride  


 


Carbon Dioxide  


 


Anion Gap  


 


BUN  


 


Creatinine  


 


Est GFR ( Amer)  


 


Est GFR (Non-Af Amer)  


 


Glucose  


 


Calcium  


 


Magnesium  2.0 











Impressions: 


 





Chest/Abdomen CTA  01/04/18 00:19


IMPRESSION:


Pulmonary arterial hypertension pattern. No evidence of pulmonary


embolus.


 














Assessment & Plan





- Diagnosis


(1) Acute respiratory failure with hypoxia and hypercapnia


Is this a current diagnosis for this admission?: Yes   


Plan: 


There was concern that this was related to opioid overdose.  Her pill count is 

correct and does not appear to have taken excessive amounts of narcotics.  Most 

likely is related to her aspiration pneumonia.  She also has a component of CHF 

however diuretics have been stopped because of elevated creatinine.  She had 

worsening of her status this morning and is now intubated.  Will consult 

pulmonary medicine for their input.








(2) Pneumonia


Qualifiers: 


   Pneumonia type: due to unspecified organism   Laterality: left   Lung 

location: lower lobe of lung   Qualified Code(s): J18.9 - Pneumonia, 

unspecified organism   


Is this a current diagnosis for this admission?: Yes   


Plan: 


Patient was minimally responsive for short period of time and may have 

aspirated with pneumonia versus pneumonitis.  We will continue with the 

Levaquin.








(3) Opiate overdose


Is this a current diagnosis for this admission?: Yes   


Plan: 


The patient had symptoms suggestive of opioid overdose however her pill count 

is correct.  That may have been that she was just more sensitive to the 

medications.








(4) Depression


Is this a current diagnosis for this admission?: Yes   





(5) CHF (congestive heart failure)


Qualifiers: 


   Congestive heart failure type: unspecified congestive heart failure type   

Congestive heart failure chronicity: unspecified congestive heart failure 

chronicity 


Is this a current diagnosis for this admission?: Yes   


Plan: 


Patient was given Lasix yesterday but today shows increase in the creatinine.  

We will stop Lasix for now.








(6) Coronary artery disease


Is this a current diagnosis for this admission?: Yes   


Plan: 


The patient had wide complex tachycardia this morning concerning for 

ventricular tachycardia.  Cardiology was consulted and they felt this most 

likely represented SVT.  Their input is greatly appreciated.  Will check serial 

cardiac enzymes.








(7) Morbid obesity


Is this a current diagnosis for this admission?: Yes   





- Time


Time Spent with patient: 35 or more minutes





- Inpatient Certification


Medical Necessity: Need Close Monitoring Due to Risk of Patient Decompensation, 

Need for IV Antibiotics

## 2018-01-06 NOTE — RADIOLOGY REPORT (SQ)
EXAM DESCRIPTION:  CHEST SINGLE VIEW



COMPLETED DATE/TIME:  1/6/2018 11:16 am



REASON FOR STUDY:  intubation



COMPARISON:  1/5/2018



EXAM PARAMETERS:  NUMBER OF VIEWS: One view.

TECHNIQUE: Single frontal radiographic view of the chest acquired.

RADIATION DOSE: NA

LIMITATIONS: None.



FINDINGS:  LUNGS AND PLEURA: No new opacities, masses or pneumothorax. No pleural effusion.

MEDIASTINUM AND HILAR STRUCTURES: No masses.  Contour normal.

HEART AND VASCULAR STRUCTURES: Stable.

BONES: No acute findings.

HARDWARE: Interval placement of endotracheal tube and nasogastric tube in adequate position.

OTHER: No other significant finding.



IMPRESSION:  SATISFACTORY POSITION OF ENDOTRACHEAL TUBE AND NASOGASTRIC TUBE.  OTHERWISE STABLE APPEA
GERMAINE OF THE CHEST.



TECHNICAL DOCUMENTATION:  JOB ID:  [id number]

 2011 [employer]- All Rights Reserved

## 2018-01-06 NOTE — RADIOLOGY REPORT (SQ)
EXAM DESCRIPTION:  KUB/ABDOMEN (SINGLE VIEW)



COMPLETED DATE/TIME:  1/6/2018 11:16 am



REASON FOR STUDY:  Check Placement of NG Tube



COMPARISON:  None.



NUMBER OF VIEWS:  One view.



TECHNIQUE:   Supine radiographic image of the UPPER abdomen acquired.



LIMITATIONS:  None.



FINDINGS:  BOWEL GAS PATTERN: Normal bowel gas pattern. No dilated loops.

CALCIFICATIONS: No suspicious calcifications.

SOFT TISSUES: No gross mass or suggestion of organomegaly.

HARDWARE: Satisfactory position nasogastric tube.  Surgical clips right upper quadrant.

BONES: No acute fracture. No worrisome bone lesions.

OTHER: No other significant finding.



IMPRESSION:  SATISFACTORY POSITION NASOGASTRIC TUBE.



TECHNICAL DOCUMENTATION:  JOB ID:  [id number]

 2011 [employer]- All Rights Reserved

## 2018-01-06 NOTE — PDOC TRANSFER SUMMARY
General


Admission Date/PCP: 


  01/04/18 02:46





  BEATRIZ MENENDEZ MD





Transfer Date: 01/06/18


Accepting Facility: [facility]


Accepting Physician: Dr. Agustin


Resuscitation Status: Full Code





- Transfer Diagnosis


(1) Acute respiratory failure with hypoxia and hypercapnia


Is this a current diagnosis for this admission?: Yes   





(2) Pneumonia


Is this a current diagnosis for this admission?: Yes   


Diagnosis Summary: 


The patient presented with aspiration.  Chest x-ray did not show an obvious 

pneumonia but most likely has pneumonitis.








(3) Opiate overdose


Is this a current diagnosis for this admission?: Yes   


Diagnosis Summary: 


The patient when she presented was thought to have a possible drug overdose 

however her pill count was correct.








(4) Depression


Is this a current diagnosis for this admission?: Yes   





(5) CHF (congestive heart failure)


Is this a current diagnosis for this admission?: Yes   





(6) Coronary artery disease


Is this a current diagnosis for this admission?: Yes   





(7) Morbid obesity


Is this a current diagnosis for this admission?: Yes   





(8) COPD (chronic obstructive pulmonary disease)


Is this a current diagnosis for this admission?: Yes   





(9) Hypernatremia


Is this a current diagnosis for this admission?: Yes   





(10) Acute kidney injury


Is this a current diagnosis for this admission?: Yes   





(11) Diabetes


Is this a current diagnosis for this admission?: Yes   





- Transfer Medications


Home Medications: 


 





Alprazolam [Xanax 0.5 mg Tablet] 0.5 mg PO Q12HP PRN 01/04/18 


Cholecalciferol (Vitamin D3) [Vitamin D3 1000 Unit Tablet] 2,000 unit PO BID 01/ 04/18 


Clopidogrel Bisulfate [Plavix 75 mg Tablet] 75 mg PO QAM 01/04/18 


Cyanocobalamin (Vitamin B-12) [Vitamin B12] 1,000 mcg PO QAM 01/04/18 


Dexlansoprazole [Dexilant 60 mg Capsule] 60 mg PO DAILY 01/04/18 


Fenofibrate Nanocrystallized [Tricor 145 mg Tablet] 145 mg PO QAM 01/04/18 


Furosemide [Lasix 80 mg Tablet] 80 mg PO DAILY 01/04/18 


Insulin Glargine,Hum.rec.anlog [Lantus Solostar] 24 unit SQ QPM 01/04/18 


Iron 65 mg PO QAM 01/04/18 


Isosorbide Mononitrate [Imdur 60 mg Tablet.er] 120 mg PO QAM 01/04/18 


Isosorbide Mononitrate [Isosorbide Mononitrate ER] 30 mg PO QPM 01/04/18 


Magnesium Oxide [Mag-Ox 400 mg Tablet] 400 mg PO BID 01/04/18 


Metformin HCl [Glucophage] 1,000 mg PO Q12 01/04/18 


Metoprolol Tartrate [Lopressor 50 mg Tablet] 50 mg PO Q12 01/04/18 


Potassium Chloride [Klor-Con 10 Meq Tablet.sa] 10 meq PO DAILY 01/04/18 


Pregabalin [Lyrica 75 mg Capsule] 75 mg PO QAM 01/04/18 


Rosuvastatin Calcium [Crestor 10 mg Tablet] 10 mg PO QPM 01/04/18 


Sennosides/Docusate Sodium [Stool Softener Tablet] 1 each PO DAILYP PRN 01/04/ 18 


Sitagliptin Phosphate [Januvia 50 mg Tablet] 100 mg PO QPM 01/04/18 


Tapentadol HCl [Nucynta] 50 mg PO QAM 01/04/18 








Transfer Medications: 


 Current Medications





Acetaminophen (Tylenol Soln 325 Mg/10.15 Ml Udcup)  650 mg NG Q4HP PRN


   PRN Reason: pain or temp greater than 101F


   Stop: 02/05/18 09:18


Albuterol/Ipratropium (Duoneb 3 Ml Ampul)  3 ml NEB RTQ6 LYNN


   Stop: 02/03/18 07:59


   Last Admin: 01/06/18 13:58 Dose:  3 ml


Albuterol/Ipratropium (Duoneb 3 Ml Ampul)  3 ml NEB [id number] PRN


   Stop: 02/03/18 02:39


Alprazolam (Xanax 0.5 Mg Tablet)  0.5 mg NG Q12HP PRN


   PRN Reason: ANXIETY


   Stop: 01/13/18 09:19


Clopidogrel Bisulfate (Plavix 75 Mg Tablet)  75 mg NG DAILY [facility]


   Stop: 02/05/18 09:59


Dextrose (Dextrose Inj 50% Syringe (25 Gm/50 Ml))  12.5 gm IV PRN PRN; Protocol


   PRN Reason: FOR BG 50-69 IN ALERT PATIENT


   Stop: 02/03/18 02:39


Dextrose (Dextrose Inj 50% Syringe (25 Gm/50 Ml))  25 gm IV PRN PRN


   PRN Reason: Protocol


   Stop: 02/03/18 02:39


Fluticasone Propionate (Flonase Nasal Spray 50 Mcg/Spray 16 Gm)  2 spray NASL 

Q12 [facility]


   Stop: 02/03/18 02:44


   Last Admin: 01/05/18 21:58 Dose:  Not Given


Glucagon (Glucagen Inj 1 Mg Vial)  1 mg IM PRN PRN; Protocol


   PRN Reason: Evaluate for BG < 70


   Stop: 02/03/18 02:39


Glucose (Glutose 40% Gel 15 Gm Tube)  15 gm PO PRN PRN; Protocol


   PRN Reason: FOR BG 50-69 IN ALERT PATIENT


   Stop: 02/03/18 02:39


Glucose (Glutose 40% Gel 15 Gm Tube)  30 gm PO PRN PRN; Protocol


   PRN Reason: FOR BG < 50 IN ALERT PATIENT 


   Stop: 02/03/18 02:39


Heparin Sodium (Porcine) (Heparin Inj 5,000 Units/Ml 1 Ml Syringe)  5,000 unit 

SUBCUT Q8 [facility]


   Stop: 02/03/18 05:59


   Last Admin: 01/06/18 05:16 Dose:  5,000 unit


Levofloxacin/Dextrose (Levaquin Rtu 750 Mg/D5w 150 Ml Premix)  750 mg in 150 

mls @ 100 mls/hr IV Q2DAYS [facility]


   Stop: 01/13/18 09:59


Influenza Virus Vaccine Quadrival (Fluzone Adlt Quad [id number] Vac 0.5 Ml Syr)  

0.5 ml IM .DISCHARGE PRN


   PRN Reason: THIS MED IS NOT "PRN"


   Stop: 02/03/18 20:50


Insulin Glargine (Lantus Insulin 100 Unit/1 Ml 10 Ml)  24 unit SUBCUT DAILY [facility]


   Stop: 02/04/18 09:59


   Last Admin: 01/05/18 13:14 Dose:  Not Given


Insulin Human Lispro (Humalog Insulin 100 Unit/1 Ml 3 Ml Vial)  0 - 12 unit 

SUBCUT ACP PRN


   PRN Reason: Protocol


   Stop: 02/03/18 02:39


   Last Admin: 01/04/18 17:30 Dose:  2 unit


Lansoprazole (Prevacid 30 Mg Odt Tablet)  30 mg NG Q6AM [facility]


   Stop: 02/06/18 05:59


Magnesium Oxide (Mag-Ox 400 Mg Tablet)  400 mg NG BID [facility]


   Stop: 02/05/18 09:59


Metoprolol Tartrate (Lopressor Inj/Pf 5 Mg/5 Ml Sdv)  5 mg IV Q6HP PRN


   PRN Reason: PULSE >110


   Stop: 02/04/18 18:21


   Last Admin: 01/06/18 04:18 Dose:  5 mg


Metoprolol Tartrate (Lopressor 50 Mg Tablet)  50 mg NG Q12 [facility]


   Stop: 02/05/18 09:59


Morphine Sulfate (Morphine 10 Mg/Ml Inj)  5 mg IV Q8HP PRN


   PRN Reason: WITHDRAWAL


   Stop: 01/13/18 05:59


Naloxone HCl (Narcan Inj 2 Mg/2 Ml Disp.Syrin)  2 mg IV Q1HP PRN


   Stop: 01/11/18 06:19


Patient Own Medication (Fluticasone Propionate [Flovent Diskus 50 Mcg])  1 puff 

NS .DAILY [facility]


   Stop: 02/03/18 09:59


Pharmacy Profile Note (Medication Communication Order)  1 each  .NOTICE NR


   Stop: 02/05/18 09:14


Sodium Chloride (Saline Flush 2.5 Ml Monoject Prefil Syrin)  2.5 ml IV Q8 [facility]


   Stop: 02/03/18 05:59


   Last Admin: 01/06/18 05:19 Dose:  2.5 ml











- Allergies


Allergies/Adverse Reactions: 


 





aspirin [Aspirin] Allergy (Severe, Verified 07/22/16 21:53)


 Generalized rash


oxytetracycline [From Terramycin] Allergy (Severe, Verified 08/28/12 17:54)


 


Penicillins Allergy (Severe, Verified 08/28/12 17:53)


 











- Diet/Activity


Discharge Diet: Diabetic


Discharge Activity: Bedrest





Hospital Course


Hospital Course: 





82-year-old female who has a history of congestive heart failure, coronary 

artery disease, COPD and diabetes as well as chronic pain requiring chronic 

opioid dependency who presented via EMS with a 2 day history of productive 

cough.  In the emergency room she was found to have hypoxia with oxygen 

saturations of 80% on room air.  Patient was placed on BiPAP and received 

albuterol and Solu-Medrol.  Patient was then admitted by the hospitalist 

service.  Patient at the time of being initially evaluated by the hospital 

service was found to be hypotensive and obtunded.  The patient did receive 

Narcan and woke up.  Patient takes Nucynta chronically.  Initially there was 

concern that the patient may have taken an excessive amount of pain medicine.  

The significant other brought her pills in and the pill count was consistent 

with taking the medications appropriately.  Patient was admitted for an acute 

COPD exacerbation along with possible aspiration pneumonia given her decreased 

mental status.  She was started on Levaquin and a CT angiogram was negative for 

any pulmonary embolism.  Patient yesterday was felt to have some evidence for 

volume overload.  This morning her creatinine was noted to be elevated from 

yesterday.  She was on BiPAP but continued to have some problems with 

respiratory difficulties.  The Lasix was stopped because of the elevated 

creatinine.  She was unresponsive even to painful stimuli this morning in spite 

of being on the BiPAP.  A blood gas showed her to have a pH 7.19 with an 

elevated PCO2 at 94.  Patient was then transferred to the intensive care unit 

and was intubated.  After intubation her acidosis and hypercarbia has resolved.

  She had some arrhythmias last night that was suspicious for ventricular 

tachycardia.  She was seen by our cardiologist Dr. Purcell this morning and 

he felt that this most likely represented SVT with aberrant conduction.  It was 

not clear whether or not this was truly V. tach or not.  Troponins were ordered 

because of the decline in the the status as well as the possibility of cardiac 

arrhythmias and she was found to have a troponin of 21.  Because of this, the 

decision was made that she needed to be transferred to a tertiary care center 

and the case was discussed with Dr. Agustin who graciously agrees to accept the 

patient when a bed becomes available at their facility at [facility].





Physical Exam


Vital Signs: 


 











Temp Pulse Resp BP Pulse Ox


 


 102.7 F H  101 H  20   115/64   95 


 


 01/06/18 14:00  01/06/18 14:00  01/06/18 14:00  01/06/18 14:00  01/06/18 14:00








 Intake & Output











 01/05/18 01/06/18 01/07/18





 06:59 06:59 06:59


 


Intake Total 260 60 300


 


Output Total 1300 1050 190


 


Balance -1040 -990 110


 


Weight 80.1 kg 73 kg 











General appearance: PRESENT: obese


Eye exam: PRESENT: conjunctiva pink.  ABSENT: scleral icterus


Mouth exam: PRESENT: moist, tongue midline


Neck exam: PRESENT: JVD


Respiratory exam: PRESENT: rales, rhonchi, wheezes


Cardiovascular exam: PRESENT: RRR, tachycardia.  ABSENT: diastolic murmur, rubs

, systolic murmur


GI/Abdominal exam: PRESENT: normal bowel sounds, soft.  ABSENT: distended, 

guarding, mass, organolmegaly, rebound, tenderness


Extremities exam: PRESENT: pedal edema.  ABSENT: calf tenderness, clubbing


Neurological exam: PRESENT: altered


Skin exam: PRESENT: dry, intact, warm.  ABSENT: cyanosis, rash





Results


Laboratory Results: 


 





 01/06/18 04:30 





 01/06/18 04:30 





 











  01/06/18 01/06/18 01/06/18





  04:30 04:30 04:30


 


WBC  8.9  


 


RBC  3.83  


 


Hgb  11.2 L  


 


Hct  34.5 L  


 


MCV  90  


 


MCH  29.2  


 


MCHC  32.4  


 


RDW  15.7 H  


 


Plt Count  210  


 


Seg Neutrophils %  87.1 H  


 


Lymphocytes %  6.0 L  


 


Monocytes %  6.6  


 


Eosinophils %  0.0  


 


Basophils %  0.3  


 


Absolute Neutrophils  7.8  


 


Absolute Lymphocytes  0.5  


 


Absolute Monocytes  0.6  


 


Absolute Eosinophils  0.0  


 


Absolute Basophils  0.0  


 


Carbonic Acid   


 


HCO3/H2CO3 Ratio   


 


ABG pH   


 


ABG pCO2   


 


ABG pO2   


 


ABG HCO3   


 


ABG O2 Saturation   


 


ABG Base Excess   


 


FiO2   


 


Sodium   149.7 H 


 


Potassium   4.2 


 


Chloride   104 


 


Carbon Dioxide   33 H 


 


Anion Gap   13 


 


BUN   32 H 


 


Creatinine   1.63 H 


 


Est GFR ( Amer)   37 L 


 


Est GFR (Non-Af Amer)   30 L 


 


Glucose   107 


 


Calcium   9.3 


 


Magnesium    2.0














  01/06/18 01/06/18





  08:45 11:45


 


WBC  


 


RBC  


 


Hgb  


 


Hct  


 


MCV  


 


MCH  


 


MCHC  


 


RDW  


 


Plt Count  


 


Seg Neutrophils %  


 


Lymphocytes %  


 


Monocytes %  


 


Eosinophils %  


 


Basophils %  


 


Absolute Neutrophils  


 


Absolute Lymphocytes  


 


Absolute Monocytes  


 


Absolute Eosinophils  


 


Absolute Basophils  


 


Carbonic Acid  2.85 H  1.27


 


HCO3/H2CO3 Ratio  12:1  21:1


 


ABG pH  7.19 L*  7.42


 


ABG pCO2  94.8 H*  42.1


 


ABG pO2  100.0  109.4 H


 


ABG HCO3  35.0 H  26.7 H


 


ABG O2 Saturation  95.5  98.1 H


 


ABG Base Excess  4.1  2.0


 


FiO2  50%  40%


 


Sodium  


 


Potassium  


 


Chloride  


 


Carbon Dioxide  


 


Anion Gap  


 


BUN  


 


Creatinine  


 


Est GFR ( Amer)  


 


Est GFR (Non-Af Amer)  


 


Glucose  


 


Calcium  


 


Magnesium  








 











  01/06/18





  12:28


 


Troponin I  21.200











Impressions: 


 





Chest/Abdomen CTA  01/04/18 00:19


IMPRESSION:


Pulmonary arterial hypertension pattern. No evidence of pulmonary


embolus.


 








Chest X-Ray  01/06/18 00:00


IMPRESSION:  SATISFACTORY POSITION OF ENDOTRACHEAL TUBE AND NASOGASTRIC TUBE.  

OTHERWISE STABLE APPEARANCE OF THE CHEST.


 








KUB X-Ray  01/06/18 09:54


IMPRESSION:  SATISFACTORY POSITION NASOGASTRIC TUBE.


 














Plan


Discharge Plan: 





Patient will be transferred to [facility] ICU.  Dr. Agustin is the accepting physician.


Time Spent: Greater than 30 Minutes

## 2018-01-08 NOTE — PDOC CONSULTATION
Consultation


Consult Date: 01/06/18


Attending physician:: JEROMY PHILLIPS


Consult reason:: Acute/chronic respiratory failure hypoxic and hypercapnic





History of Present Illness


Admission Date/PCP: 


  01/04/18 02:46





  BEATRIZ MENENDEZ MD





History of Present Illness: 


All information from chart as patient is currently intubated and sedated;PARISA LAGUERRE is a 82 year old female with a past medical history of congestive 

heart failure, coronary artery disease, chronic bronchitis, COPD, diabetes, 

opiate dependent chronic pain, benzodiazepine dependent anxiety.  Presents via 

EMS with 2 days of productive cough in the emergency room she is hypoxic with 

oxygen saturations of 80% on room air.  She is placed on BiPAP and receives 

albuterol and Solu-Medrol then referred to the hospitalist for admission.  When 

evaluated by MD she is found to be hypotensive, obtunded with pinpoint pupils.  

Glucose is 250 she receives 2 mg of Narcan and awakens to state she may have 

taken too many Nucynta. An ABG returns with hypercapnic respiratory failure.





Past Medical History


Cardiac Medical History: Reports: Congestive Heart Failure, Coronary Artery 

Disease, Hypertension


   Denies: Myocardial Infarction


Pulmonary Medical History: Reports: Asthma, Bronchitis, Pneumonia


   Denies: Chronic Obstructive Pulmonary Disease (COPD)


EENT Medical History: 


   Denies: Ears, Nose


Neurological Medical History: 


   Denies: Multiple Sclerosis, Seizures


Endocrine Medical History: Reports: Diabetes Mellitus Type 2, Obesity


   Denies: Gestational Diabetes


Renal/ Medical History: 


   Denies: Nephrolithiasis


GI Medical History: 


   Denies: Crohn's Disease, Ulcerative Colitis


Musculoskeltal Medical History: Reports: Arthritis


   Denies: Fibromyalgia


Skin Medical History: Reports: Psoriasis


Psychiatric Medical History: Reports: Depression, General Anxiety Disorder, 

Other - Opiate dependent chronic pain


Traumatic Medical History: 


   Denies: Traumatic Brain Injury


Hematology: Reports: Anemia


   Denies: Sickle Cell Disease


Infectious Medical History: 


   Denies: Hepatitis B, Hepatitis C





Past Surgical History


Past Surgical History: Reports: Coronary Artery Bypass Graft


   Denies: Pacemaker





Social History


Information Source: [facility] Records


Smoking Status: Former Smoker


Frequency of Alcohol Use: None


Hx Recreational Drug Use: No


Drugs: None


Hx Prescription Drug Abuse: No





- Advance Directive


Resuscitation Status: Full Code





Family History


Parental Family History Reviewed: No


Children Family History Reviewed: No


Sibling(s) Family History Reviewed.: No





Medication/Allergy


Home Medications: 








Alprazolam [Xanax 0.5 mg Tablet] 0.5 mg PO Q12HP PRN 01/04/18 


Cholecalciferol (Vitamin D3) [Vitamin D3 1000 Unit Tablet] 2,000 unit PO BID 01/ 04/18 


Clopidogrel Bisulfate [Plavix 75 mg Tablet] 75 mg PO QAM 01/04/18 


Cyanocobalamin (Vitamin B-12) [Vitamin B12] 1,000 mcg PO QAM 01/04/18 


Dexlansoprazole [Dexilant 60 mg Capsule] 60 mg PO DAILY 01/04/18 


Fenofibrate Nanocrystallized [Tricor 145 mg Tablet] 145 mg PO QAM 01/04/18 


Furosemide [Lasix 80 mg Tablet] 80 mg PO DAILY 01/04/18 


Insulin Glargine,Hum.rec.anlog [Lantus Solostar] 24 unit SQ QPM 01/04/18 


Iron 65 mg PO QAM 01/04/18 


Isosorbide Mononitrate [Imdur 60 mg Tablet.er] 120 mg PO QAM 01/04/18 


Isosorbide Mononitrate [Isosorbide Mononitrate ER] 30 mg PO QPM 01/04/18 


Magnesium Oxide [Mag-Ox 400 mg Tablet] 400 mg PO BID 01/04/18 


Metformin HCl [Glucophage] 1,000 mg PO Q12 01/04/18 


Metoprolol Tartrate [Lopressor 50 mg Tablet] 50 mg PO Q12 01/04/18 


Potassium Chloride [Klor-Con 10 Meq Tablet.sa] 10 meq PO DAILY 01/04/18 


Pregabalin [Lyrica 75 mg Capsule] 75 mg PO QAM 01/04/18 


Rosuvastatin Calcium [Crestor 10 mg Tablet] 10 mg PO QPM 01/04/18 


Sennosides/Docusate Sodium [Stool Softener Tablet] 1 each PO DAILYP PRN 01/04/ 18 


Sitagliptin Phosphate [Januvia 50 mg Tablet] 100 mg PO QPM 01/04/18 


Tapentadol HCl [Nucynta] 50 mg PO QAM 01/04/18 








Allergies/Adverse Reactions: 


 





aspirin [Aspirin] Allergy (Severe, Verified 07/22/16 21:53)


 Generalized rash


oxytetracycline [From Terramycin] Allergy (Severe, Verified 08/28/12 17:54)


 


Penicillins Allergy (Severe, Verified 08/28/12 17:53)


 











Review of Systems


ROS unobtainable: Due to endotracheal tube





Physical Exam


Vital Signs: 


 











Temp Pulse Resp BP Pulse Ox


 


 100.5 F H  107 H  19   97/58 L  100 


 


 01/06/18 10:00  01/06/18 10:00  01/06/18 10:00  01/06/18 10:00  01/06/18 10:00








 Intake & Output











 01/05/18 01/06/18 01/07/18





 06:59 06:59 06:59


 


Intake Total 260 60 


 


Output Total 1300 1050 60


 


Balance -1040 -990 -60


 


Weight 80.1 kg 73 kg 











General appearance: PRESENT: no acute distress, disheveled, obese, well-

developed.  ABSENT: cooperative, hard of hearing, mild distress, morbidly obese

, severe distress


Head exam: PRESENT: atraumatic, normocephalic


Eye exam: PRESENT: conjunctiva pale.  ABSENT: conjunctival injection, 

conjunctiva pink, nystagmus, periorbital swelling, scleral icterus


Mouth exam: PRESENT: dry mucosa, neck supple, tongue midline, other - ET tube 

in place.  ABSENT: laceration, moist


Neck exam: ABSENT: carotid bruit, JVD, lymphadenopathy, thyromegaly, tracheal 

deviation, tracheostomy


Respiratory exam: PRESENT: crackles, decreased breath sounds, prolonged 

expiratory phas, rales, rhonchi, symmetrical, unlabored, wheezes.  ABSENT: 

accessory muscle use, chest wall tenderness, clear to auscultation erin, 

retraction, stridor, tachypnea


Cardiovascular exam: PRESENT: RRR, +S1, +S2


Pulses: PRESENT: normal radial pulses


GI/Abdominal exam: PRESENT: diminished bowel sounds, soft


Gentrourinary exam: PRESENT: indwelling catheter


Extremities exam: ABSENT: clubbing, joint swelling


Musculoskeletal exam: ABSENT: ambulatory, deformity, dislocation, full ROM


Neurological exam: ABSENT: alert, altered, awake


Skin exam: PRESENT: dry, warm





Results


Laboratory Results: 


 





 01/06/18 04:30 





 01/06/18 04:30 





 











  01/05/18 01/06/18 01/06/18





  11:25 04:30 04:30


 


WBC   8.9 


 


RBC   3.83 


 


Hgb   11.2 L 


 


Hct   34.5 L 


 


MCV   90 


 


MCH   29.2 


 


MCHC   32.4 


 


RDW   15.7 H 


 


Plt Count   210 


 


Seg Neutrophils %   87.1 H 


 


Lymphocytes %   6.0 L 


 


Monocytes %   6.6 


 


Eosinophils %   0.0 


 


Basophils %   0.3 


 


Absolute Neutrophils   7.8 


 


Absolute Lymphocytes   0.5 


 


Absolute Monocytes   0.6 


 


Absolute Eosinophils   0.0 


 


Absolute Basophils   0.0 


 


Carbonic Acid  2.09 H  


 


HCO3/H2CO3 Ratio  15:1  


 


ABG pH  7.29 L  


 


ABG pCO2  69.5 H*  


 


ABG pO2  110.2 H  


 


ABG HCO3  32.4 H  


 


ABG O2 Saturation  97.3  


 


ABG Base Excess  3.4  


 


FiO2  50%  


 


Sodium    149.7 H


 


Potassium    4.2


 


Chloride    104


 


Carbon Dioxide    33 H


 


Anion Gap    13


 


BUN    32 H


 


Creatinine    1.63 H


 


Est GFR ( Amer)    37 L


 


Est GFR (Non-Af Amer)    30 L


 


Glucose    107


 


Calcium    9.3


 


Magnesium   














  01/06/18 01/06/18





  04:30 08:45


 


WBC  


 


RBC  


 


Hgb  


 


Hct  


 


MCV  


 


MCH  


 


MCHC  


 


RDW  


 


Plt Count  


 


Seg Neutrophils %  


 


Lymphocytes %  


 


Monocytes %  


 


Eosinophils %  


 


Basophils %  


 


Absolute Neutrophils  


 


Absolute Lymphocytes  


 


Absolute Monocytes  


 


Absolute Eosinophils  


 


Absolute Basophils  


 


Carbonic Acid   2.85 H


 


HCO3/H2CO3 Ratio   12:1


 


ABG pH   7.19 L*


 


ABG pCO2   94.8 H*


 


ABG pO2   100.0


 


ABG HCO3   35.0 H


 


ABG O2 Saturation   95.5


 


ABG Base Excess   4.1


 


FiO2   50%


 


Sodium  


 


Potassium  


 


Chloride  


 


Carbon Dioxide  


 


Anion Gap  


 


BUN  


 


Creatinine  


 


Est GFR ( Amer)  


 


Est GFR (Non-Af Amer)  


 


Glucose  


 


Calcium  


 


Magnesium  2.0 











Impressions: 


 





Chest/Abdomen CTA  01/04/18 00:19


IMPRESSION:


Pulmonary arterial hypertension pattern. No evidence of pulmonary


embolus.


 








Chest X-Ray  01/05/18 00:00


IMPRESSION:


 


Worsened mild mixed interstitial and airspace opacity may


indicate pulmonary edema and/or multifocal pneumonia.


 


 


 2011 [employer]- All Rights Reserved


 














Assessment & Plan





- Diagnosis


(1) Acute respiratory failure with hypoxia and hypercapnia


Is this a current diagnosis for this admission?: Yes   


Plan: 


support as needed








(2) Opiate overdose


Is this a current diagnosis for this admission?: Yes   


Plan: 


maintain baseline avoid withdrawal








(3) Sepsis


Qualifiers: 


   Sepsis type: sepsis due to unspecified organism   Qualified Code(s): A41.9 - 

Sepsis, unspecified organism   


Is this a current diagnosis for this admission?: Yes   





(4) CHF (congestive heart failure)


Qualifiers: 


   Congestive heart failure type: unspecified congestive heart failure type   

Congestive heart failure chronicity: unspecified congestive heart failure 

chronicity 


Is this a current diagnosis for this admission?: Yes   





(5) Systemic inflammatory response syndrome


Is this a current diagnosis for this admission?: Yes   





- Time


Total Critical Time (Minutes): 55
